# Patient Record
Sex: MALE | Race: BLACK OR AFRICAN AMERICAN | NOT HISPANIC OR LATINO | URBAN - METROPOLITAN AREA
[De-identification: names, ages, dates, MRNs, and addresses within clinical notes are randomized per-mention and may not be internally consistent; named-entity substitution may affect disease eponyms.]

---

## 2018-06-01 ENCOUNTER — OUTPATIENT (OUTPATIENT)
Dept: OUTPATIENT SERVICES | Facility: HOSPITAL | Age: 58
LOS: 1 days | End: 2018-06-01
Payer: MEDICAID

## 2018-06-01 PROCEDURE — G9001: CPT

## 2018-06-12 DIAGNOSIS — R69 ILLNESS, UNSPECIFIED: ICD-10-CM

## 2018-07-01 ENCOUNTER — OUTPATIENT (OUTPATIENT)
Dept: OUTPATIENT SERVICES | Facility: HOSPITAL | Age: 58
LOS: 1 days | End: 2018-07-01

## 2018-07-16 DIAGNOSIS — Z71.89 OTHER SPECIFIED COUNSELING: ICD-10-CM

## 2018-12-18 ENCOUNTER — APPOINTMENT (OUTPATIENT)
Dept: PEDIATRIC ALLERGY IMMUNOLOGY | Facility: CLINIC | Age: 58
End: 2018-12-18

## 2019-02-25 ENCOUNTER — NON-APPOINTMENT (OUTPATIENT)
Age: 59
End: 2019-02-25

## 2019-02-25 ENCOUNTER — LABORATORY RESULT (OUTPATIENT)
Age: 59
End: 2019-02-25

## 2019-02-25 ENCOUNTER — APPOINTMENT (OUTPATIENT)
Dept: PEDIATRIC ALLERGY IMMUNOLOGY | Facility: CLINIC | Age: 59
End: 2019-02-25
Payer: MEDICARE

## 2019-02-25 VITALS
HEART RATE: 106 BPM | DIASTOLIC BLOOD PRESSURE: 93 MMHG | BODY MASS INDEX: 33.27 KG/M2 | SYSTOLIC BLOOD PRESSURE: 146 MMHG | WEIGHT: 212 LBS | HEIGHT: 66.93 IN

## 2019-02-25 DIAGNOSIS — Z83.3 FAMILY HISTORY OF DIABETES MELLITUS: ICD-10-CM

## 2019-02-25 DIAGNOSIS — E11.3552 TYPE 2 DIABETES MELLITUS WITH STABLE PROLIFERATIVE DIABETIC RETINOPATHY, LEFT EYE: ICD-10-CM

## 2019-02-25 DIAGNOSIS — Z83.42 FAMILY HISTORY OF FAMILIAL HYPERCHOLESTEROLEMIA: ICD-10-CM

## 2019-02-25 DIAGNOSIS — I10 ESSENTIAL (PRIMARY) HYPERTENSION: ICD-10-CM

## 2019-02-25 DIAGNOSIS — D86.9 SARCOIDOSIS, UNSPECIFIED: ICD-10-CM

## 2019-02-25 DIAGNOSIS — R79.89 OTHER SPECIFIED ABNORMAL FINDINGS OF BLOOD CHEMISTRY: ICD-10-CM

## 2019-02-25 DIAGNOSIS — R52 PAIN, UNSPECIFIED: ICD-10-CM

## 2019-02-25 DIAGNOSIS — Z13.29 ENCOUNTER FOR SCREENING FOR OTHER SUSPECTED ENDOCRINE DISORDER: ICD-10-CM

## 2019-02-25 DIAGNOSIS — Z13.228 ENCOUNTER FOR SCREENING FOR OTHER SUSPECTED ENDOCRINE DISORDER: ICD-10-CM

## 2019-02-25 DIAGNOSIS — R76.8 OTHER SPECIFIED ABNORMAL IMMUNOLOGICAL FINDINGS IN SERUM: ICD-10-CM

## 2019-02-25 DIAGNOSIS — E78.00 PURE HYPERCHOLESTEROLEMIA, UNSPECIFIED: ICD-10-CM

## 2019-02-25 DIAGNOSIS — Z13.0 ENCOUNTER FOR SCREENING FOR OTHER SUSPECTED ENDOCRINE DISORDER: ICD-10-CM

## 2019-02-25 PROCEDURE — 99205 OFFICE O/P NEW HI 60 MIN: CPT | Mod: 25

## 2019-02-25 PROCEDURE — 94060 EVALUATION OF WHEEZING: CPT

## 2019-02-25 PROCEDURE — 95004 PERQ TESTS W/ALRGNC XTRCS: CPT

## 2019-02-25 PROCEDURE — 94664 DEMO&/EVAL PT USE INHALER: CPT | Mod: 59

## 2019-02-25 RX ORDER — METFORMIN HYDROCHLORIDE 1000 MG/1
1000 TABLET, COATED ORAL
Refills: 0 | Status: ACTIVE | COMMUNITY
Start: 2019-02-25

## 2019-02-25 RX ORDER — CETIRIZINE HYDROCHLORIDE 10 MG/1
10 TABLET, COATED ORAL
Qty: 1 | Refills: 3 | Status: ACTIVE | COMMUNITY
Start: 2019-02-25

## 2019-02-25 RX ORDER — PREDNISONE 20 MG/1
20 TABLET ORAL DAILY
Qty: 8 | Refills: 0 | Status: ACTIVE | COMMUNITY
Start: 2019-02-25

## 2019-02-25 RX ORDER — DULOXETINE HYDROCHLORIDE 30 MG/1
30 CAPSULE, DELAYED RELEASE PELLETS ORAL TWICE DAILY
Refills: 0 | Status: ACTIVE | COMMUNITY
Start: 2019-02-25

## 2019-02-25 RX ORDER — LOSARTAN POTASSIUM 25 MG/1
25 TABLET, FILM COATED ORAL DAILY
Qty: 30 | Refills: 5 | Status: ACTIVE | COMMUNITY
Start: 2019-02-25

## 2019-02-25 RX ORDER — ROSUVASTATIN CALCIUM 40 MG/1
40 TABLET, FILM COATED ORAL DAILY
Refills: 0 | Status: ACTIVE | COMMUNITY
Start: 2019-02-25

## 2019-02-25 RX ORDER — ISOSORBIDE MONONITRATE 30 MG/1
30 TABLET, EXTENDED RELEASE ORAL DAILY
Refills: 0 | Status: ACTIVE | COMMUNITY
Start: 2019-02-25

## 2019-02-25 RX ORDER — MELOXICAM 7.5 MG/1
7.5 TABLET ORAL
Refills: 0 | Status: ACTIVE | COMMUNITY
Start: 2019-02-25

## 2019-02-25 RX ORDER — IBUPROFEN 800 MG/1
800 TABLET, FILM COATED ORAL
Refills: 0 | Status: ACTIVE | COMMUNITY
Start: 2019-02-25

## 2019-02-25 RX ORDER — DILTIAZEM HYDROCHLORIDE 360 MG/1
360 CAPSULE, EXTENDED RELEASE ORAL DAILY
Refills: 0 | Status: ACTIVE | COMMUNITY
Start: 2019-02-25

## 2019-02-25 RX ORDER — ACETAMINOPHEN 500 MG/1
500 TABLET ORAL
Refills: 0 | Status: ACTIVE | COMMUNITY
Start: 2019-02-25

## 2019-02-25 RX ORDER — MONTELUKAST 10 MG/1
10 TABLET, FILM COATED ORAL
Refills: 0 | Status: ACTIVE | COMMUNITY
Start: 2019-02-25

## 2019-02-25 RX ORDER — MULTIVIT-MIN/FOLIC/VIT K/LYCOP 400-300MCG
25 MCG TABLET ORAL DAILY
Refills: 0 | Status: ACTIVE | COMMUNITY
Start: 2019-02-25

## 2019-02-25 RX ORDER — GABAPENTIN 600 MG/1
600 TABLET, COATED ORAL TWICE DAILY
Refills: 0 | Status: ACTIVE | COMMUNITY
Start: 2019-02-25

## 2019-02-25 RX ORDER — HYDROCHLOROTHIAZIDE 12.5 MG/1
12.5 TABLET ORAL DAILY
Refills: 0 | Status: ACTIVE | COMMUNITY
Start: 2019-02-25

## 2019-02-25 RX ORDER — BENZONATATE 200 MG/1
200 CAPSULE ORAL 3 TIMES DAILY
Qty: 42 | Refills: 0 | Status: ACTIVE | COMMUNITY
Start: 2019-02-25

## 2019-02-26 PROBLEM — R76.8 ELEVATED IGE LEVEL: Status: ACTIVE | Noted: 2019-02-26

## 2019-02-26 PROBLEM — Z13.29 SCREENING FOR OTHER AND UNSPECIFIED ENDOCRINE, NUTRITIONAL, METABOLIC AND IMMUNITY DISORDERS: Status: ACTIVE | Noted: 2019-02-26

## 2019-02-26 PROBLEM — E78.00 HYPERCHOLESTEREMIA: Status: ACTIVE | Noted: 2019-02-25

## 2019-02-26 PROBLEM — D86.9 SARCOIDOSIS: Status: ACTIVE | Noted: 2019-02-25

## 2019-02-26 LAB
BASOPHILS # BLD AUTO: 0.04 K/UL
BASOPHILS NFR BLD AUTO: 0.5 %
CD16+CD56+ CELLS # BLD: 177 /UL
CD16+CD56+ CELLS NFR BLD: 13 %
CD19 CELLS NFR BLD: 302 /UL
CD3 CELLS # BLD: 873 /UL
CD3 CELLS NFR BLD: 63 %
CD3+CD4+ CELLS # BLD: 692 /UL
CD3+CD4+ CELLS NFR BLD: 50 %
CD3+CD4+ CELLS/CD3+CD8+ CLL SPEC: 3.67 RATIO
CD3+CD8+ CELLS # SPEC: 188 /UL
CD3+CD8+ CELLS NFR BLD: 14 %
CELLS.CD3-CD19+/CELLS IN BLOOD: 22 %
CH50 SERPL-MCNC: 73 U/ML
DEPRECATED KAPPA LC FREE/LAMBDA SER: 1.11 RATIO
EOSINOPHIL # BLD AUTO: 0.08 K/UL
EOSINOPHIL NFR BLD AUTO: 0.9 %
HCT VFR BLD CALC: 44.2 %
HGB BLD-MCNC: 13.7 G/DL
IGA SER QL IEP: 370 MG/DL
IGG SER QL IEP: 1406 MG/DL
IGM SER QL IEP: 29 MG/DL
IMM GRANULOCYTES NFR BLD AUTO: 0.2 %
KAPPA LC CSF-MCNC: 2.21 MG/DL
KAPPA LC SERPL-MCNC: 2.46 MG/DL
LYMPHOCYTES # BLD AUTO: 1.25 K/UL
LYMPHOCYTES NFR BLD AUTO: 14.4 %
MAN DIFF?: NORMAL
MCHC RBC-ENTMCNC: 24.8 PG
MCHC RBC-ENTMCNC: 31 GM/DL
MCV RBC AUTO: 79.9 FL
MONOCYTES # BLD AUTO: 0.63 K/UL
MONOCYTES NFR BLD AUTO: 7.3 %
NEUTROPHILS # BLD AUTO: 6.64 K/UL
NEUTROPHILS NFR BLD AUTO: 76.7 %
PLATELET # BLD AUTO: 310 K/UL
RBC # BLD: 5.53 M/UL
RBC # FLD: 14.6 %
WBC # FLD AUTO: 8.66 K/UL

## 2019-02-26 NOTE — REASON FOR VISIT
[Initial Consultation] : an initial consultation for [Formal Caregiver] : formal caregiver [Cough] : cough [Abnormal Labwork] : abnormal immunology labwork

## 2019-02-27 LAB
G6PD SER-CCNC: 15.4 U/G HGB
MEV IGG FLD QL IA: >300 AU/ML
MEV IGG+IGM SER-IMP: POSITIVE
MYELOPEROXIDASE SPEC: POSITIVE
VZV AB TITR SER: POSITIVE
VZV IGG SER IF-ACNC: 2304 INDEX

## 2019-02-28 LAB — IGE SER-MCNC: 1897 IU/ML

## 2019-03-01 LAB
A FLAVUS AB FLD QL: NEGATIVE
A FUMIGATUS AB FLD QL: NEGATIVE
A NIGER AB FLD QL: NEGATIVE

## 2019-03-04 LAB — LPT PW BLD-NRATE: NORMAL

## 2019-03-04 NOTE — DATA REVIEWED
[FreeTextEntry1] : outside medical recordL\par 7/2018\par Immunocap to common aeroallergens: \par positive to dust mite, cockroach, molds (inlcuding A. fumigatus)\par negative to weeds, cat dander, dog dander, \par \par ImmunoCAP to foods:\par Positive to cow's milk (0.72), wheat (0.36) - he consumes milk and wheat regularly w/o complaints\par negative to cod, peanut, shrimp, walnut, egg white, corn, soy, clam, sesame, scallop\par \par labs from 9/2018\par Negative Aspergillus galactomannan\par negative filaria IgG4\par negative Fungitell\par unremarkable ACE \par unremarkable procalcitonin\par negative strongyloides Ab\par \par labs from 9/2017\par negative HIV CMIA\par \par spirometry from 1/2018\par normal flows with no improvement after bronchodilator therapy\par essentially normal lung volumes\par normal DLCO, SpO2\par \par 9/6/18 CT sinus w/o contrast\par no air fluid levels or mucoperiosteal thickening to suggest either acute or chronic sinusitis. Ostiomeatal complexes are patent B/L\par \par 9/5/18 CTA chest w/ contrast\par no evidence of PE. nonspecific pulmonary opacities \par \par 1/19/18 CT chest PE w/ contrast\par no intraluminal filling defects w/in main pulmonary arteries however the segmental branch arteries cannot be assessed adequately. no evidence of acute pulmonary process.

## 2019-03-04 NOTE — CONSULT LETTER
[Dear  ___] : Dear  [unfilled], [Please see my note below.] : Please see my note below. [Consult Closing:] : Thank you very much for allowing me to participate in the care of this patient.  If you have any questions, please do not hesitate to contact me. [Sincerely,] : Sincerely, [DrLeif  ___] : Dr. ISAACS [FreeTextEntry3] : Jack Yates III  MPH, MD, PhD, FACP, FACAAI, FAAAAI \par , Departments of Medicine and Pediatrics \par [employer] [facility] [employer] [facility] \par , [facility] [employer] [facility] \par Attending Physician, Division of Allergy & Immunology [facility]\par \par \par

## 2019-03-04 NOTE — HISTORY OF PRESENT ILLNESS
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 He is referred by [facility] pulmonary for chronic cough.\par \par Chronic steroid use & cough: He has been on chronic steroids for at least 2 years (first for sarcoidosis, then for cough/asthma). Per outside medical records, he has been on Prednisone 40mg daily at least since 9/2018. (Prior to that he was on an unspecified dose oF prednisone for sarcoidosis from [id number]). During this time, his elevated serum IgE was noted to decreased (9/2018 = 4247, [id number]). Despite taking his medications, he continues to have chronic cough that is not improved. The cough started 2-3 yrs ago, and occurs all year round. In the past, cough was dry but currently sometimes productive for phlegm.  The cough persists despite benzonatate 3x a day and Prednisone 40mg daily. Of note, he is on losartan, and patient does not think there is an association with the cough and this medication. The cough is not associated with fevers, chills, weight loss. He does admit to getting antibiotics several times  a year - he cannot estimate how many times but thinks <10times a year. He says he also gets "colds" frequently.\par \par asthma:  ACT today is 12 suggesting suboptimal control. He takes singulair 10mg daily and albuterol MDI as rescue medication when needed. When shown a chart of currently approved asthma inhaler medications, he pointed out that he uses albuterol MDI at least once daily. He did not recognize/point out any controller inhalers on the chart. Per outside medical record, he is supposed to be on Symbicort 160/4.5 BID. He did not bring any inhalers with him to the visit, but alireza all his other medication bottles. He is referred to [facility] for evaluation and consideration for possible biologic therapy. His ACT today is 12 suggesting suboptimal control.\par \par GERD: per outside medical record, patient has evidence of moderate GERD on esophogram and should be on Nexium and Ranitidine. He states his reflux symptoms are well controlled. He did not bring pill bottles for Nexium and Ranitidine and was not sure if he was taking these.\par \par Sarcoidosis: per outside medical record, he has biopsy proven sarcoidosis. He was previously on prednisone from 1/2016-2/2017 for this. HIs PFTs done in 1/2018 were reportedly normal and CT in 1/2018 and 9/2018 did not show any nodules or lymphadenopathy, so he is currently not on any treatment for this.\par \par rhinitis: he states he gets runny, stuffy nose throughout the year. There is no seasonality to his symptoms. He has been on cetirizine "for a while" which he thinks helps his nasal complaints. he denies using nose sprays in the past. No obvious triggers have been identified. He does not think he has used antihistamines in a "few days"\par \par Positive ImmunoCAP to foods: Prior testing was positive to cow's milk and wheat. he consumes milk and wheat regularly w/o complaints. His diet is otherwise unrestricted. [FreeTextEntry7] : 12

## 2019-03-04 NOTE — REVIEW OF SYSTEMS
[Immunizations are up to date] : Immunizations are up to date [Received Influenza Vaccine this Past Year] : patient has received the Influenza vaccine this past year [Rhinorrhea] : rhinorrhea [Nasal Congestion] : nasal congestion [Cough] : cough [Nl] : Genitourinary [SOB with Exertion] : no dyspnea on exertion [Nocturnal Awakening] : no nocturnal awakening with shortness of breath [Wheezing] : no wheezing

## 2019-03-04 NOTE — SOCIAL HISTORY
[Spouse/Partner] : spouse/partner [Grade:  _____] : Grade: [unfilled] [Apartment] : [unfilled] lives in an apartment  [Length of Occupancy (yrs)___] : the length of occupancy is [unfilled] years [Radiator/Baseboard] : heating provided by radiator(s)/baseboard(s) [Window Units] : air conditioning provided by window units [None] : none [] :  [de-identified] : child at home [FreeTextEntry2] : works as  [Humidifier] : does not use a humidifier [Dehumidifier] : does not use a dehumidifier [Cockroaches] : Patient states that there are no cockroaches in the home [Dust Mite Covers] : does not have dust mite covers [Feather Pillows] : does not have feather pillows [Feather Comforter] : does not have a feather comforter [Bedroom] : not in the bedroom [Living Area] : not in the living area [Smokers in Household] : there are no smokers in the home

## 2019-03-04 NOTE — PHYSICAL EXAM
[Alert] : alert [Well Nourished] : well nourished [Healthy Appearance] : healthy appearance [No Acute Distress] : no acute distress [Well Developed] : well developed [Normal Pupil & Iris Size/Symmetry] : normal pupil and iris size and symmetry [No Discharge] : no discharge [No Photophobia] : no photophobia [Sclera Not Icteric] : sclera not icteric [Normal TMs] : both tympanic membranes were normal [Normal Nasal Mucosa] : the nasal mucosa was normal [Normal Lips/Tongue] : the lips and tongue were normal [Normal Outer Ear/Nose] : the ears and nose were normal in appearance [Normal Tonsils] : normal tonsils [No Thrush] : no thrush [Normal Dentition] : normal dentition [No Oral Lesions or Ulcers] : no oral lesions or ulcers [Supple] : the neck was supple [Normal Rate and Effort] : normal respiratory rhythm and effort [Normal Palpation] : palpation of the chest revealed no abnormalities [No Crackles] : no crackles [No Retractions] : no retractions [Bilateral Audible Breath Sounds] : bilateral audible breath sounds [Normal Rate] : heart rate was normal  [Normal S1, S2] : normal S1 and S2 [No murmur] : no murmur [Regular Rhythm] : with a regular rhythm [Soft] : abdomen soft [Not Tender] : non-tender [Not Distended] : not distended [No HSM] : no hepato-splenomegaly [Normal Cervical Lymph Nodes] : cervical [Normal Axillary Lumph Nodes] : axillary [Skin Intact] : skin intact  [No Rash] : no rash [No Skin Lesions] : no skin lesions [No Joint Swelling or Erythema] : no joint swelling or erythema [No clubbing] : no clubbing [No Edema] : no edema [No Cyanosis] : no cyanosis [Normal Mood] : mood was normal [Normal Affect] : affect was normal [Alert, Awake, Oriented as Age-Appropriate] : alert, awake, oriented as age appropriate [No Motor Deficits] : the motor exam was normal [Conjunctival Erythema] : no conjunctival erythema [Suborbital Bogginess] : no suborbital bogginess (allergic shiners) [Boggy Nasal Turbinates] : no boggy and/or pale nasal turbinates [Pharyngeal erythema] : no pharyngeal erythema [Exudate] : no exudate [Posterior Pharyngeal Cobblestoning] : no posterior pharyngeal cobblestoning [Clear Rhinorrhea] : no clear rhinorrhea was seen [Wheezing] : no wheezing was heard [Eczematous Patches] : no eczematous patches [Xerosis] : no xerosis [de-identified] : not dermatographic

## 2019-03-06 LAB
ASPERGILLUS FLAVUS PRECIPITINS: NEGATIVE
ASPERGILLUS FUMIGATES PRECIPTINS: NEGATIVE
ASPERGILLUS NIGER PRECIPITINS: NEGATIVE

## 2019-03-08 LAB
A FUMIGATUS IGE QN: 1.81 KUA/L
A NIGER IGE QN: 0.37 KUA/L
DEPRECATED A FUMIGATUS IGE RAST QL: 2
DEPRECATED A NIGER IGE RAST QL: 1
HAEM INFLU B AB SER-MCNC: NORMAL

## 2019-03-13 LAB
DEPRECATED S PNEUM 1 IGG SER-MCNC: 13.9
DEPRECATED S PNEUM12 AB SER-ACNC: 3.9
DEPRECATED S PNEUM14 AB SER-ACNC: 26.3
DEPRECATED S PNEUM17 IGG SER IA-MCNC: 39.1
DEPRECATED S PNEUM18 IGG SER IA-MCNC: 24
DEPRECATED S PNEUM19 IGG SER-MCNC: 29
DEPRECATED S PNEUM19 IGG SER-MCNC: >150
DEPRECATED S PNEUM2 IGG SER-MCNC: 4.8
DEPRECATED S PNEUM20 IGG SER-MCNC: 25.3
DEPRECATED S PNEUM22 IGG SER-MCNC: 46.5
DEPRECATED S PNEUM23 AB SER-ACNC: 67.3
DEPRECATED S PNEUM3 AB SER-ACNC: 12.4
DEPRECATED S PNEUM34 IGG SER-MCNC: 30.6
DEPRECATED S PNEUM4 AB SER-ACNC: 3.1
DEPRECATED S PNEUM5 IGG SER-MCNC: 11.9
DEPRECATED S PNEUM6 IGG SER-MCNC: 13.7
DEPRECATED S PNEUM7 IGG SER-ACNC: 21.4
DEPRECATED S PNEUM8 AB SER-ACNC: 12
DEPRECATED S PNEUM9 AB SER-ACNC: 28.2
DEPRECATED S PNEUM9 IGG SER-MCNC: 17.6
STREPTOCOCCUS PNEUMONIAE SEROTYPE 11A: 6.7
STREPTOCOCCUS PNEUMONIAE SEROTYPE 15B: 10.2
STREPTOCOCCUS PNEUMONIAE SEROTYPE 33F: 8.3

## 2019-05-14 ENCOUNTER — APPOINTMENT (OUTPATIENT)
Dept: PEDIATRIC ALLERGY IMMUNOLOGY | Facility: CLINIC | Age: 59
End: 2019-05-14
Payer: MEDICARE

## 2019-05-14 VITALS
OXYGEN SATURATION: 95 % | WEIGHT: 250 LBS | HEIGHT: 66 IN | BODY MASS INDEX: 40.18 KG/M2 | SYSTOLIC BLOOD PRESSURE: 121 MMHG | DIASTOLIC BLOOD PRESSURE: 74 MMHG | HEART RATE: 98 BPM

## 2019-05-14 PROCEDURE — 96372 THER/PROPH/DIAG INJ SC/IM: CPT

## 2019-05-14 RX ORDER — DUPILUMAB 300 MG/2ML
300 INJECTION, SOLUTION SUBCUTANEOUS
Refills: 0 | Status: COMPLETED | OUTPATIENT
Start: 2019-05-14 | End: 1900-01-01

## 2019-05-14 RX ADMIN — DUPILUMAB 0 MG/2ML: 300 INJECTION, SOLUTION SUBCUTANEOUS at 00:00

## 2019-05-22 RX ADMIN — DUPILUMAB 0 MG/2ML: 300 INJECTION, SOLUTION SUBCUTANEOUS at 00:00

## 2019-05-29 ENCOUNTER — APPOINTMENT (OUTPATIENT)
Dept: PEDIATRIC ALLERGY IMMUNOLOGY | Facility: CLINIC | Age: 59
End: 2019-05-29
Payer: MEDICARE

## 2019-05-29 VITALS — SYSTOLIC BLOOD PRESSURE: 123 MMHG | DIASTOLIC BLOOD PRESSURE: 79 MMHG | OXYGEN SATURATION: 95 % | HEART RATE: 98 BPM

## 2019-05-29 PROCEDURE — 96372 THER/PROPH/DIAG INJ SC/IM: CPT

## 2019-05-29 RX ORDER — DUPILUMAB 300 MG/2ML
300 INJECTION, SOLUTION SUBCUTANEOUS
Qty: 0 | Refills: 0 | Status: COMPLETED | OUTPATIENT
Start: 2019-05-22

## 2019-06-05 RX ADMIN — DUPILUMAB 0 MG/2ML: 300 INJECTION, SOLUTION SUBCUTANEOUS at 00:00

## 2019-06-11 ENCOUNTER — APPOINTMENT (OUTPATIENT)
Dept: PEDIATRIC ALLERGY IMMUNOLOGY | Facility: CLINIC | Age: 59
End: 2019-06-11

## 2019-06-12 ENCOUNTER — APPOINTMENT (OUTPATIENT)
Dept: PEDIATRIC ALLERGY IMMUNOLOGY | Facility: CLINIC | Age: 59
End: 2019-06-12
Payer: MEDICARE

## 2019-06-12 VITALS — OXYGEN SATURATION: 94 % | DIASTOLIC BLOOD PRESSURE: 85 MMHG | HEART RATE: 86 BPM | SYSTOLIC BLOOD PRESSURE: 127 MMHG

## 2019-06-12 PROCEDURE — 96372 THER/PROPH/DIAG INJ SC/IM: CPT

## 2019-06-12 RX ORDER — DUPILUMAB 300 MG/2ML
300 INJECTION, SOLUTION SUBCUTANEOUS
Qty: 0 | Refills: 0 | Status: COMPLETED | OUTPATIENT
Start: 2019-06-05

## 2019-06-19 RX ADMIN — DUPILUMAB 0 MG/2ML: 300 INJECTION, SOLUTION SUBCUTANEOUS at 00:00

## 2019-06-24 ENCOUNTER — APPOINTMENT (OUTPATIENT)
Dept: PEDIATRIC ALLERGY IMMUNOLOGY | Facility: CLINIC | Age: 59
End: 2019-06-24
Payer: MEDICARE

## 2019-06-24 VITALS
HEART RATE: 99 BPM | SYSTOLIC BLOOD PRESSURE: 120 MMHG | HEIGHT: 66 IN | WEIGHT: 216 LBS | OXYGEN SATURATION: 97 % | DIASTOLIC BLOOD PRESSURE: 75 MMHG | BODY MASS INDEX: 34.72 KG/M2

## 2019-06-24 DIAGNOSIS — J31.0 CHRONIC RHINITIS: ICD-10-CM

## 2019-06-24 DIAGNOSIS — Z91.038 OTHER INSECT ALLERGY STATUS: ICD-10-CM

## 2019-06-24 DIAGNOSIS — R05 COUGH: ICD-10-CM

## 2019-06-24 DIAGNOSIS — J30.89 OTHER ALLERGIC RHINITIS: ICD-10-CM

## 2019-06-24 DIAGNOSIS — Z91.09 OTHER ALLERGY STATUS, OTHER THAN TO DRUGS AND BIOLOGICAL SUBSTANCES: ICD-10-CM

## 2019-06-24 DIAGNOSIS — Z91.048 OTHER NONMEDICINAL SUBSTANCE ALLERGY STATUS: ICD-10-CM

## 2019-06-24 DIAGNOSIS — H10.13 ACUTE ATOPIC CONJUNCTIVITIS, BILATERAL: ICD-10-CM

## 2019-06-24 PROCEDURE — 96372 THER/PROPH/DIAG INJ SC/IM: CPT

## 2019-06-24 PROCEDURE — 99214 OFFICE O/P EST MOD 30 MIN: CPT | Mod: 25

## 2019-06-24 RX ORDER — AZELASTINE HYDROCHLORIDE 137 UG/1
0.1 SPRAY, METERED NASAL
Qty: 1 | Refills: 3 | Status: ACTIVE | COMMUNITY
Start: 2019-06-24 | End: 1900-01-01

## 2019-06-24 RX ORDER — KETOTIFEN FUMARATE 0.25 MG/ML
0.03 SOLUTION OPHTHALMIC
Qty: 1 | Refills: 3 | Status: ACTIVE | COMMUNITY
Start: 2019-06-24 | End: 1900-01-01

## 2019-06-24 NOTE — CONSULT LETTER
[Dear  ___] : Dear  [unfilled], [Courtesy Letter:] : I had the pleasure of seeing your patient, [unfilled], in my office today. [DrLeif  ___] : Dr. ISAACS [Please see my note below.] : Please see my note below. [Sincerely,] : Sincerely, [FreeTextEntry3] : Jack Yates III  MPH, MD, PhD, FACP, FACAAI, FAAAAI \par , Departments of Medicine and Pediatrics \par [employer] [facility] [employer] at [facility] \par , [facility] [employer] [facility] [employer] [facility] \par Attending Physician, Division of Allergy & Immunology [facility]\par \par \par

## 2019-06-24 NOTE — PHYSICAL EXAM
[Alert] : alert [Well Nourished] : well nourished [Healthy Appearance] : healthy appearance [Well Developed] : well developed [No Acute Distress] : no acute distress [Normal Pupil & Iris Size/Symmetry] : normal pupil and iris size and symmetry [No Discharge] : no discharge [Sclera Not Icteric] : sclera not icteric [No Photophobia] : no photophobia [Conjunctival Erythema] : no conjunctival erythema [Suborbital Bogginess] : no suborbital bogginess (allergic shiners) [Normal TMs] : both tympanic membranes were normal [Normal Nasal Mucosa] : the nasal mucosa was normal [Normal Lips/Tongue] : the lips and tongue were normal [Normal Outer Ear/Nose] : the ears and nose were normal in appearance [Normal Tonsils] : normal tonsils [No Thrush] : no thrush [Normal Dentition] : normal dentition [No Oral Lesions or Ulcers] : no oral lesions or ulcers [Boggy Nasal Turbinates] : no boggy and/or pale nasal turbinates [Pharyngeal erythema] : no pharyngeal erythema [Posterior Pharyngeal Cobblestoning] : no posterior pharyngeal cobblestoning [Exudate] : no exudate [Clear Rhinorrhea] : no clear rhinorrhea was seen [Supple] : the neck was supple [Normal Rate and Effort] : normal respiratory rhythm and effort [Normal Palpation] : palpation of the chest revealed no abnormalities [No Crackles] : no crackles [No Retractions] : no retractions [Bilateral Audible Breath Sounds] : bilateral audible breath sounds [Wheezing] : no wheezing was heard [Normal Rate] : heart rate was normal  [Normal S1, S2] : normal S1 and S2 [No murmur] : no murmur [Regular Rhythm] : with a regular rhythm [Soft] : abdomen soft [Not Tender] : non-tender [Not Distended] : not distended [No HSM] : no hepato-splenomegaly [Normal Cervical Lymph Nodes] : cervical [Normal Axillary Lumph Nodes] : axillary [Skin Intact] : skin intact  [No Rash] : no rash [No Skin Lesions] : no skin lesions [Eczematous Patches] : no eczematous patches [Xerosis] : no xerosis [No Joint Swelling or Erythema] : no joint swelling or erythema [No clubbing] : no clubbing [No Edema] : no edema [No Cyanosis] : no cyanosis [No Motor Deficits] : the motor exam was normal [Normal Mood] : mood was normal [Normal Affect] : affect was normal [Alert, Awake, Oriented as Age-Appropriate] : alert, awake, oriented as age appropriate [de-identified] : not dermatographic

## 2019-06-24 NOTE — DATA REVIEWED
[FreeTextEntry1] : labs from 2/2019\par cellular immune eval:\par unremarkable CBC\par unremarkable T, B, NK cell counts\par unremarkable lymphocyte proliferation studies\par \par humoral immune eval:\par elevated IgE, low IgM, elevated kappa light chain with otherwise unremarkable IgG, IgA, Lambda light chain\par protective titers to measles, varicella, Hib, pneumococcus\par QNS: titers to tetanus\par \par phagocyte eval:\par unremarkable myeloperoxidase stain, G6PD, CGD assay\par \par complement eval:\par unremarkable CH50\par \par ABPA eval:\par positive IgE to A. fumigatus, A. niger\par negative precipitins to A. niger, A. flavus, A. fumigatus\par \par sarcoid eval:\par QNS: ACE level \par

## 2019-06-24 NOTE — REVIEW OF SYSTEMS
[Eye Itching] : itchy eyes [Rhinorrhea] : no rhinorrhea [Nasal Congestion] : no nasal congestion [Sneezing] : no sneezing [SOB with Exertion] : no dyspnea on exertion [Nocturnal Awakening] : no nocturnal awakening with shortness of breath [Cough] : cough [Wheezing] : no wheezing [Nl] : Genitourinary

## 2019-06-24 NOTE — HISTORY OF PRESENT ILLNESS
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 \par \par he started Dupixent in May 2019, and is now on Q 2 week dosing per protocol. Patient states the his cough is not significantly improved on Dupixent.\par he continues to be on losartan (ARB) which can cause cough in some patients. He has not spoken to his primary care doctor or other specialists about switching of ARB if possible.\par he is also currently taking Singulair and cetirizine regularly, and he doesn't currently complain of any nasal complaints any denies any post nasal . He gets occasionally itchy watery eyes but is not currently using any eye drops. Prior ImmunoCAP testing was positive to cockroach, dust mite, and molds.\par ACT today is 5 suggesting suboptimal asthma control. He has follow up with pulmonary in July. He currently denies exercise intolerance but admits to not being a very active person to begin with. He denies wheezing but states that he uses his albuterol at least 1-2x  a day. he denies nocturnal complaints. [< or = 15] : < than or = 15  [FreeTextEntry7] : 5

## 2019-06-24 NOTE — REASON FOR VISIT
[Routine Follow-Up] : a routine follow-up visit for [Source: ______] : History obtained from [unfilled] [[employer] ] : provided by [employer]   [Asthma] : asthma [Cough] : cough [FreeTextEntry1] : [id number] [FreeTextEntry2] : Elijah

## 2019-07-03 RX ADMIN — DUPILUMAB 0 MG/2ML: 300 INJECTION, SOLUTION SUBCUTANEOUS at 00:00

## 2019-07-08 ENCOUNTER — APPOINTMENT (OUTPATIENT)
Dept: PEDIATRIC ALLERGY IMMUNOLOGY | Facility: CLINIC | Age: 59
End: 2019-07-08
Payer: MEDICARE

## 2019-07-08 VITALS — SYSTOLIC BLOOD PRESSURE: 118 MMHG | HEART RATE: 97 BPM | DIASTOLIC BLOOD PRESSURE: 79 MMHG

## 2019-07-08 PROCEDURE — 96372 THER/PROPH/DIAG INJ SC/IM: CPT

## 2019-07-08 RX ORDER — DUPILUMAB 300 MG/2ML
300 INJECTION, SOLUTION SUBCUTANEOUS
Qty: 0 | Refills: 0 | Status: COMPLETED | OUTPATIENT
Start: 2019-07-03

## 2019-07-11 RX ORDER — DUPILUMAB 300 MG/2ML
300 INJECTION, SOLUTION SUBCUTANEOUS
Qty: 0 | Refills: 0 | Status: COMPLETED | OUTPATIENT
Start: 2019-06-19

## 2019-07-17 RX ADMIN — DUPILUMAB 0 MG/2ML: 300 INJECTION, SOLUTION SUBCUTANEOUS at 00:00

## 2019-07-22 ENCOUNTER — APPOINTMENT (OUTPATIENT)
Dept: PEDIATRIC ALLERGY IMMUNOLOGY | Facility: CLINIC | Age: 59
End: 2019-07-22
Payer: MEDICARE

## 2019-07-22 VITALS — OXYGEN SATURATION: 94 % | HEART RATE: 102 BPM | SYSTOLIC BLOOD PRESSURE: 138 MMHG | DIASTOLIC BLOOD PRESSURE: 78 MMHG

## 2019-07-22 PROCEDURE — 96372 THER/PROPH/DIAG INJ SC/IM: CPT

## 2019-07-22 RX ORDER — DUPILUMAB 300 MG/2ML
300 INJECTION, SOLUTION SUBCUTANEOUS
Qty: 0 | Refills: 0 | Status: COMPLETED | OUTPATIENT
Start: 2019-05-14

## 2019-07-31 RX ADMIN — DUPILUMAB 0 MG/2ML: 300 INJECTION, SOLUTION SUBCUTANEOUS at 00:00

## 2019-08-06 ENCOUNTER — APPOINTMENT (OUTPATIENT)
Dept: PEDIATRIC ALLERGY IMMUNOLOGY | Facility: CLINIC | Age: 59
End: 2019-08-06
Payer: MEDICARE

## 2019-08-06 VITALS — SYSTOLIC BLOOD PRESSURE: 124 MMHG | HEART RATE: 106 BPM | OXYGEN SATURATION: 96 % | DIASTOLIC BLOOD PRESSURE: 84 MMHG

## 2019-08-06 PROCEDURE — 96372 THER/PROPH/DIAG INJ SC/IM: CPT

## 2019-08-06 RX ORDER — DUPILUMAB 300 MG/2ML
300 INJECTION, SOLUTION SUBCUTANEOUS
Qty: 0 | Refills: 0 | Status: COMPLETED | OUTPATIENT
Start: 2019-07-31

## 2019-08-14 RX ADMIN — DUPILUMAB 0 MG/2ML: 300 INJECTION, SOLUTION SUBCUTANEOUS at 00:00

## 2019-08-20 ENCOUNTER — APPOINTMENT (OUTPATIENT)
Dept: PEDIATRIC ALLERGY IMMUNOLOGY | Facility: CLINIC | Age: 59
End: 2019-08-20
Payer: MEDICARE

## 2019-08-20 VITALS
SYSTOLIC BLOOD PRESSURE: 123 MMHG | DIASTOLIC BLOOD PRESSURE: 84 MMHG | HEART RATE: 84 BPM | OXYGEN SATURATION: 95 % | HEIGHT: 66 IN

## 2019-08-20 PROCEDURE — 96372 THER/PROPH/DIAG INJ SC/IM: CPT

## 2019-08-20 RX ORDER — DUPILUMAB 300 MG/2ML
300 INJECTION, SOLUTION SUBCUTANEOUS
Qty: 0 | Refills: 0 | Status: COMPLETED | OUTPATIENT
Start: 2019-08-14

## 2019-08-28 RX ADMIN — DUPILUMAB 0 MG/2ML: 300 INJECTION, SOLUTION SUBCUTANEOUS at 00:00

## 2019-09-03 ENCOUNTER — APPOINTMENT (OUTPATIENT)
Dept: PEDIATRIC ALLERGY IMMUNOLOGY | Facility: CLINIC | Age: 59
End: 2019-09-03

## 2019-09-03 VITALS — HEART RATE: 108 BPM | SYSTOLIC BLOOD PRESSURE: 133 MMHG | DIASTOLIC BLOOD PRESSURE: 88 MMHG

## 2019-09-06 ENCOUNTER — APPOINTMENT (OUTPATIENT)
Dept: PEDIATRIC ALLERGY IMMUNOLOGY | Facility: CLINIC | Age: 59
End: 2019-09-06
Payer: MEDICARE

## 2019-09-06 VITALS — HEART RATE: 99 BPM | SYSTOLIC BLOOD PRESSURE: 124 MMHG | DIASTOLIC BLOOD PRESSURE: 79 MMHG | OXYGEN SATURATION: 99 %

## 2019-09-06 PROCEDURE — 96372 THER/PROPH/DIAG INJ SC/IM: CPT

## 2019-09-06 RX ORDER — DUPILUMAB 300 MG/2ML
300 INJECTION, SOLUTION SUBCUTANEOUS
Qty: 0 | Refills: 0 | Status: COMPLETED | OUTPATIENT
Start: 2019-08-28

## 2019-09-11 RX ADMIN — DUPILUMAB 0 MG/2ML: 300 INJECTION, SOLUTION SUBCUTANEOUS at 00:00

## 2019-09-20 ENCOUNTER — APPOINTMENT (OUTPATIENT)
Dept: PEDIATRIC ALLERGY IMMUNOLOGY | Facility: CLINIC | Age: 59
End: 2019-09-20
Payer: MEDICARE

## 2019-09-20 VITALS — SYSTOLIC BLOOD PRESSURE: 131 MMHG | DIASTOLIC BLOOD PRESSURE: 88 MMHG | HEART RATE: 100 BPM | OXYGEN SATURATION: 95 %

## 2019-09-20 PROCEDURE — 96372 THER/PROPH/DIAG INJ SC/IM: CPT

## 2019-09-20 RX ORDER — DUPILUMAB 300 MG/2ML
300 INJECTION, SOLUTION SUBCUTANEOUS
Qty: 0 | Refills: 0 | Status: COMPLETED | OUTPATIENT
Start: 2019-09-11

## 2019-09-25 RX ADMIN — DUPILUMAB 0 MG/2ML: 300 INJECTION, SOLUTION SUBCUTANEOUS at 00:00

## 2019-10-04 ENCOUNTER — APPOINTMENT (OUTPATIENT)
Dept: PEDIATRIC ALLERGY IMMUNOLOGY | Facility: CLINIC | Age: 59
End: 2019-10-04

## 2019-10-04 VITALS — OXYGEN SATURATION: 96 % | DIASTOLIC BLOOD PRESSURE: 84 MMHG | HEART RATE: 106 BPM | SYSTOLIC BLOOD PRESSURE: 124 MMHG

## 2019-10-09 RX ADMIN — DUPILUMAB 0 MG/2ML: 300 INJECTION, SOLUTION SUBCUTANEOUS at 00:00

## 2019-10-23 RX ADMIN — DUPILUMAB 0 MG/2ML: 300 INJECTION, SOLUTION SUBCUTANEOUS at 00:00

## 2019-11-06 RX ADMIN — DUPILUMAB 0 MG/2ML: 300 INJECTION, SOLUTION SUBCUTANEOUS at 00:00

## 2019-11-20 RX ADMIN — DUPILUMAB 0 MG/2ML: 300 INJECTION, SOLUTION SUBCUTANEOUS at 00:00

## 2019-11-22 ENCOUNTER — APPOINTMENT (OUTPATIENT)
Dept: PEDIATRIC ALLERGY IMMUNOLOGY | Facility: CLINIC | Age: 59
End: 2019-11-22
Payer: MEDICARE

## 2019-11-22 VITALS
SYSTOLIC BLOOD PRESSURE: 132 MMHG | BODY MASS INDEX: 35.57 KG/M2 | OXYGEN SATURATION: 95 % | WEIGHT: 220.4 LBS | HEART RATE: 95 BPM | DIASTOLIC BLOOD PRESSURE: 88 MMHG

## 2019-11-22 PROCEDURE — 96372 THER/PROPH/DIAG INJ SC/IM: CPT

## 2019-11-22 RX ORDER — DUPILUMAB 300 MG/2ML
300 INJECTION, SOLUTION SUBCUTANEOUS
Qty: 0 | Refills: 0 | Status: COMPLETED | OUTPATIENT
Start: 2019-11-20

## 2019-12-04 RX ADMIN — DUPILUMAB 0 MG/2ML: 300 INJECTION, SOLUTION SUBCUTANEOUS at 00:00

## 2019-12-18 RX ADMIN — DUPILUMAB 0 MG/2ML: 300 INJECTION, SOLUTION SUBCUTANEOUS at 00:00

## 2019-12-20 ENCOUNTER — APPOINTMENT (OUTPATIENT)
Dept: PEDIATRIC ALLERGY IMMUNOLOGY | Facility: CLINIC | Age: 59
End: 2019-12-20
Payer: MEDICARE

## 2019-12-20 VITALS — DIASTOLIC BLOOD PRESSURE: 82 MMHG | HEART RATE: 98 BPM | SYSTOLIC BLOOD PRESSURE: 124 MMHG | OXYGEN SATURATION: 98 %

## 2019-12-20 PROCEDURE — 96372 THER/PROPH/DIAG INJ SC/IM: CPT

## 2019-12-20 RX ORDER — DUPILUMAB 300 MG/2ML
300 INJECTION, SOLUTION SUBCUTANEOUS
Qty: 0 | Refills: 0 | Status: COMPLETED | OUTPATIENT
Start: 2019-12-18

## 2020-01-01 RX ADMIN — DUPILUMAB 0 MG/2ML: 300 INJECTION, SOLUTION SUBCUTANEOUS at 00:00

## 2020-01-03 ENCOUNTER — APPOINTMENT (OUTPATIENT)
Dept: PEDIATRIC ALLERGY IMMUNOLOGY | Facility: CLINIC | Age: 60
End: 2020-01-03
Payer: MEDICARE

## 2020-01-03 VITALS — DIASTOLIC BLOOD PRESSURE: 85 MMHG | OXYGEN SATURATION: 99 % | HEART RATE: 102 BPM | SYSTOLIC BLOOD PRESSURE: 130 MMHG

## 2020-01-03 PROCEDURE — 96372 THER/PROPH/DIAG INJ SC/IM: CPT

## 2020-01-03 RX ORDER — DUPILUMAB 300 MG/2ML
300 INJECTION, SOLUTION SUBCUTANEOUS
Qty: 0 | Refills: 0 | Status: COMPLETED | OUTPATIENT
Start: 2020-01-01

## 2020-01-15 RX ADMIN — DUPILUMAB 0 MG/2ML: 300 INJECTION, SOLUTION SUBCUTANEOUS at 00:00

## 2020-01-17 ENCOUNTER — APPOINTMENT (OUTPATIENT)
Dept: PEDIATRIC ALLERGY IMMUNOLOGY | Facility: CLINIC | Age: 60
End: 2020-01-17
Payer: MEDICARE

## 2020-01-17 ENCOUNTER — APPOINTMENT (OUTPATIENT)
Dept: PEDIATRIC ALLERGY IMMUNOLOGY | Facility: CLINIC | Age: 60
End: 2020-01-17

## 2020-01-17 VITALS — OXYGEN SATURATION: 96 % | SYSTOLIC BLOOD PRESSURE: 139 MMHG | HEART RATE: 93 BPM | DIASTOLIC BLOOD PRESSURE: 80 MMHG

## 2020-01-17 PROCEDURE — 96372 THER/PROPH/DIAG INJ SC/IM: CPT

## 2020-01-17 RX ORDER — DUPILUMAB 300 MG/2ML
300 INJECTION, SOLUTION SUBCUTANEOUS
Qty: 0 | Refills: 0 | Status: COMPLETED | OUTPATIENT
Start: 2020-01-15

## 2020-01-29 RX ADMIN — DUPILUMAB 0 MG/2ML: 300 INJECTION, SOLUTION SUBCUTANEOUS at 00:00

## 2020-01-31 ENCOUNTER — APPOINTMENT (OUTPATIENT)
Dept: PEDIATRIC ALLERGY IMMUNOLOGY | Facility: CLINIC | Age: 60
End: 2020-01-31
Payer: MEDICARE

## 2020-01-31 VITALS — DIASTOLIC BLOOD PRESSURE: 90 MMHG | OXYGEN SATURATION: 96 % | HEART RATE: 110 BPM | SYSTOLIC BLOOD PRESSURE: 139 MMHG

## 2020-01-31 PROCEDURE — 96372 THER/PROPH/DIAG INJ SC/IM: CPT

## 2020-01-31 RX ORDER — DUPILUMAB 300 MG/2ML
300 INJECTION, SOLUTION SUBCUTANEOUS
Qty: 0 | Refills: 0 | Status: COMPLETED | OUTPATIENT
Start: 2020-01-29

## 2020-02-12 RX ADMIN — DUPILUMAB 0 MG/2ML: 300 INJECTION, SOLUTION SUBCUTANEOUS at 00:00

## 2020-02-14 ENCOUNTER — APPOINTMENT (OUTPATIENT)
Dept: PEDIATRIC ALLERGY IMMUNOLOGY | Facility: CLINIC | Age: 60
End: 2020-02-14
Payer: MEDICARE

## 2020-02-14 VITALS
HEIGHT: 65.98 IN | OXYGEN SATURATION: 97 % | DIASTOLIC BLOOD PRESSURE: 82 MMHG | HEART RATE: 103 BPM | SYSTOLIC BLOOD PRESSURE: 121 MMHG

## 2020-02-14 PROCEDURE — 96372 THER/PROPH/DIAG INJ SC/IM: CPT

## 2020-02-14 RX ORDER — DUPILUMAB 300 MG/2ML
300 INJECTION, SOLUTION SUBCUTANEOUS
Qty: 0 | Refills: 0 | Status: COMPLETED | OUTPATIENT
Start: 2020-02-12

## 2020-02-26 RX ADMIN — DUPILUMAB 0 MG/2ML: 300 INJECTION, SOLUTION SUBCUTANEOUS at 00:00

## 2020-03-06 ENCOUNTER — APPOINTMENT (OUTPATIENT)
Dept: PEDIATRIC ALLERGY IMMUNOLOGY | Facility: CLINIC | Age: 60
End: 2020-03-06
Payer: MEDICARE

## 2020-03-06 VITALS — DIASTOLIC BLOOD PRESSURE: 86 MMHG | HEART RATE: 93 BPM | OXYGEN SATURATION: 99 % | SYSTOLIC BLOOD PRESSURE: 131 MMHG

## 2020-03-06 PROCEDURE — 96372 THER/PROPH/DIAG INJ SC/IM: CPT

## 2020-03-06 RX ORDER — DUPILUMAB 300 MG/2ML
300 INJECTION, SOLUTION SUBCUTANEOUS
Qty: 0 | Refills: 0 | Status: COMPLETED | OUTPATIENT
Start: 2020-02-26

## 2020-03-11 RX ADMIN — DUPILUMAB 0 MG/2ML: 300 INJECTION, SOLUTION SUBCUTANEOUS at 00:00

## 2020-03-25 RX ADMIN — DUPILUMAB 0 MG/2ML: 300 INJECTION, SOLUTION SUBCUTANEOUS at 00:00

## 2020-03-27 ENCOUNTER — APPOINTMENT (OUTPATIENT)
Dept: PEDIATRIC ALLERGY IMMUNOLOGY | Facility: CLINIC | Age: 60
End: 2020-03-27

## 2020-04-08 RX ADMIN — DUPILUMAB 0 MG/2ML: 300 INJECTION, SOLUTION SUBCUTANEOUS at 00:00

## 2020-04-22 RX ADMIN — DUPILUMAB 0 MG/2ML: 300 INJECTION, SOLUTION SUBCUTANEOUS at 00:00

## 2020-04-29 ENCOUNTER — RX RENEWAL (OUTPATIENT)
Age: 60
End: 2020-04-29

## 2020-05-06 RX ADMIN — DUPILUMAB 0 MG/2ML: 300 INJECTION, SOLUTION SUBCUTANEOUS at 00:00

## 2020-06-12 ENCOUNTER — APPOINTMENT (OUTPATIENT)
Dept: PEDIATRIC ALLERGY IMMUNOLOGY | Facility: CLINIC | Age: 60
End: 2020-06-12
Payer: MEDICARE

## 2020-06-12 VITALS
TEMPERATURE: 98.8 F | WEIGHT: 206 LBS | HEART RATE: 94 BPM | DIASTOLIC BLOOD PRESSURE: 89 MMHG | HEIGHT: 65.98 IN | SYSTOLIC BLOOD PRESSURE: 135 MMHG | OXYGEN SATURATION: 94 % | BODY MASS INDEX: 33.11 KG/M2

## 2020-06-12 PROCEDURE — 96372 THER/PROPH/DIAG INJ SC/IM: CPT

## 2020-06-12 RX ORDER — DUPILUMAB 300 MG/2ML
300 INJECTION, SOLUTION SUBCUTANEOUS
Qty: 0 | Refills: 0 | Status: COMPLETED | OUTPATIENT
Start: 2020-05-06

## 2020-06-26 ENCOUNTER — APPOINTMENT (OUTPATIENT)
Dept: PEDIATRIC ALLERGY IMMUNOLOGY | Facility: CLINIC | Age: 60
End: 2020-06-26
Payer: MEDICARE

## 2020-06-26 VITALS
OXYGEN SATURATION: 93 % | TEMPERATURE: 99.1 F | HEART RATE: 84 BPM | SYSTOLIC BLOOD PRESSURE: 112 MMHG | DIASTOLIC BLOOD PRESSURE: 77 MMHG

## 2020-06-26 PROCEDURE — 96372 THER/PROPH/DIAG INJ SC/IM: CPT

## 2020-06-26 RX ORDER — DUPILUMAB 300 MG/2ML
300 INJECTION, SOLUTION SUBCUTANEOUS
Qty: 0 | Refills: 0 | Status: COMPLETED | OUTPATIENT
Start: 2020-04-22

## 2020-07-10 ENCOUNTER — APPOINTMENT (OUTPATIENT)
Dept: PEDIATRIC ALLERGY IMMUNOLOGY | Facility: CLINIC | Age: 60
End: 2020-07-10
Payer: MEDICARE

## 2020-07-10 VITALS — HEART RATE: 106 BPM

## 2020-07-10 PROCEDURE — 96372 THER/PROPH/DIAG INJ SC/IM: CPT

## 2020-07-10 RX ORDER — DUPILUMAB 300 MG/2ML
300 INJECTION, SOLUTION SUBCUTANEOUS
Qty: 0 | Refills: 0 | Status: COMPLETED | OUTPATIENT
Start: 2020-04-08

## 2020-07-24 ENCOUNTER — APPOINTMENT (OUTPATIENT)
Dept: PEDIATRIC ALLERGY IMMUNOLOGY | Facility: CLINIC | Age: 60
End: 2020-07-24
Payer: MEDICARE

## 2020-07-24 VITALS — HEART RATE: 116 BPM

## 2020-07-24 PROCEDURE — 96372 THER/PROPH/DIAG INJ SC/IM: CPT

## 2020-07-24 RX ORDER — DUPILUMAB 300 MG/2ML
300 INJECTION, SOLUTION SUBCUTANEOUS
Qty: 0 | Refills: 0 | Status: COMPLETED | OUTPATIENT
Start: 2020-03-25

## 2020-08-14 ENCOUNTER — APPOINTMENT (OUTPATIENT)
Dept: PEDIATRIC ALLERGY IMMUNOLOGY | Facility: CLINIC | Age: 60
End: 2020-08-14
Payer: MEDICARE

## 2020-08-14 VITALS
HEART RATE: 100 BPM | OXYGEN SATURATION: 96 % | SYSTOLIC BLOOD PRESSURE: 130 MMHG | DIASTOLIC BLOOD PRESSURE: 84 MMHG | TEMPERATURE: 98 F

## 2020-08-14 PROCEDURE — 96372 THER/PROPH/DIAG INJ SC/IM: CPT

## 2020-08-14 RX ORDER — DUPILUMAB 300 MG/2ML
300 INJECTION, SOLUTION SUBCUTANEOUS
Qty: 0 | Refills: 0 | Status: COMPLETED | OUTPATIENT
Start: 2020-08-14

## 2020-08-14 RX ORDER — DUPILUMAB 300 MG/2ML
300 INJECTION, SOLUTION SUBCUTANEOUS
Qty: 2 | Refills: 5 | Status: DISCONTINUED | COMMUNITY
Start: 2019-04-26 | End: 2020-08-14

## 2020-08-14 RX ORDER — DUPILUMAB 300 MG/2ML
300 INJECTION, SOLUTION SUBCUTANEOUS
Refills: 0 | Status: COMPLETED | OUTPATIENT
Start: 2020-08-14 | End: 1900-01-01

## 2020-08-14 RX ADMIN — DUPILUMAB 0 MG/2ML: 300 INJECTION, SOLUTION SUBCUTANEOUS at 00:00

## 2020-08-22 RX ADMIN — DUPILUMAB 0 MG/2ML: 300 INJECTION, SOLUTION SUBCUTANEOUS at 00:00

## 2020-08-28 ENCOUNTER — APPOINTMENT (OUTPATIENT)
Dept: PEDIATRIC ALLERGY IMMUNOLOGY | Facility: CLINIC | Age: 60
End: 2020-08-28
Payer: MEDICARE

## 2020-08-28 VITALS
WEIGHT: 206 LBS | OXYGEN SATURATION: 94 % | HEART RATE: 92 BPM | TEMPERATURE: 97.6 F | DIASTOLIC BLOOD PRESSURE: 81 MMHG | BODY MASS INDEX: 33.11 KG/M2 | HEIGHT: 65.98 IN | SYSTOLIC BLOOD PRESSURE: 117 MMHG

## 2020-08-28 PROCEDURE — 96372 THER/PROPH/DIAG INJ SC/IM: CPT

## 2020-08-28 RX ORDER — DUPILUMAB 300 MG/2ML
300 INJECTION, SOLUTION SUBCUTANEOUS
Qty: 0 | Refills: 0 | Status: COMPLETED | OUTPATIENT
Start: 2020-08-22

## 2020-09-05 RX ADMIN — DUPILUMAB 0 MG/2ML: 300 INJECTION, SOLUTION SUBCUTANEOUS at 00:00

## 2020-09-11 ENCOUNTER — APPOINTMENT (OUTPATIENT)
Dept: PEDIATRIC ALLERGY IMMUNOLOGY | Facility: CLINIC | Age: 60
End: 2020-09-11
Payer: MEDICARE

## 2020-09-11 VITALS
OXYGEN SATURATION: 96 % | SYSTOLIC BLOOD PRESSURE: 126 MMHG | TEMPERATURE: 97.3 F | DIASTOLIC BLOOD PRESSURE: 86 MMHG | HEART RATE: 93 BPM

## 2020-09-11 PROCEDURE — 96372 THER/PROPH/DIAG INJ SC/IM: CPT

## 2020-09-11 RX ORDER — DUPILUMAB 300 MG/2ML
300 INJECTION, SOLUTION SUBCUTANEOUS
Qty: 0 | Refills: 0 | Status: COMPLETED | OUTPATIENT
Start: 2020-09-05

## 2020-09-19 RX ADMIN — DUPILUMAB 0 MG/2ML: 300 INJECTION, SOLUTION SUBCUTANEOUS at 00:00

## 2020-09-28 ENCOUNTER — APPOINTMENT (OUTPATIENT)
Dept: PEDIATRIC ALLERGY IMMUNOLOGY | Facility: CLINIC | Age: 60
End: 2020-09-28

## 2020-10-02 ENCOUNTER — APPOINTMENT (OUTPATIENT)
Dept: PEDIATRIC ALLERGY IMMUNOLOGY | Facility: CLINIC | Age: 60
End: 2020-10-02
Payer: MEDICARE

## 2020-10-02 VITALS — RESPIRATION RATE: 16 BRPM | SYSTOLIC BLOOD PRESSURE: 121 MMHG | DIASTOLIC BLOOD PRESSURE: 86 MMHG | HEART RATE: 94 BPM

## 2020-10-02 PROCEDURE — 96372 THER/PROPH/DIAG INJ SC/IM: CPT

## 2020-10-02 RX ORDER — DUPILUMAB 300 MG/2ML
300 INJECTION, SOLUTION SUBCUTANEOUS
Qty: 0 | Refills: 0 | Status: COMPLETED | OUTPATIENT
Start: 2020-09-19

## 2020-10-03 RX ADMIN — DUPILUMAB 0 MG/2ML: 300 INJECTION, SOLUTION SUBCUTANEOUS at 00:00

## 2020-10-16 ENCOUNTER — APPOINTMENT (OUTPATIENT)
Dept: PEDIATRIC ALLERGY IMMUNOLOGY | Facility: CLINIC | Age: 60
End: 2020-10-16
Payer: MEDICARE

## 2020-10-16 VITALS — BODY MASS INDEX: 33.04 KG/M2 | HEART RATE: 102 BPM | WEIGHT: 204.59 LBS

## 2020-10-16 PROCEDURE — 96372 THER/PROPH/DIAG INJ SC/IM: CPT

## 2020-10-16 RX ORDER — DUPILUMAB 300 MG/2ML
300 INJECTION, SOLUTION SUBCUTANEOUS
Qty: 0 | Refills: 0 | Status: COMPLETED | OUTPATIENT
Start: 2020-10-03

## 2020-10-17 RX ADMIN — DUPILUMAB 0 MG/2ML: 300 INJECTION, SOLUTION SUBCUTANEOUS at 00:00

## 2020-10-30 ENCOUNTER — APPOINTMENT (OUTPATIENT)
Dept: PEDIATRIC ALLERGY IMMUNOLOGY | Facility: CLINIC | Age: 60
End: 2020-10-30
Payer: MEDICARE

## 2020-10-30 VITALS
WEIGHT: 206 LBS | SYSTOLIC BLOOD PRESSURE: 121 MMHG | TEMPERATURE: 97.6 F | OXYGEN SATURATION: 92 % | DIASTOLIC BLOOD PRESSURE: 77 MMHG | HEART RATE: 94 BPM | BODY MASS INDEX: 33.27 KG/M2

## 2020-10-30 PROCEDURE — 96372 THER/PROPH/DIAG INJ SC/IM: CPT

## 2020-10-30 RX ORDER — DUPILUMAB 300 MG/2ML
300 INJECTION, SOLUTION SUBCUTANEOUS
Qty: 0 | Refills: 0 | Status: COMPLETED | OUTPATIENT
Start: 2020-10-17

## 2020-10-31 RX ADMIN — DUPILUMAB 0 MG/2ML: 300 INJECTION, SOLUTION SUBCUTANEOUS at 00:00

## 2020-11-14 RX ADMIN — DUPILUMAB 0 MG/2ML: 300 INJECTION, SOLUTION SUBCUTANEOUS at 00:00

## 2020-11-20 ENCOUNTER — APPOINTMENT (OUTPATIENT)
Dept: PEDIATRIC ALLERGY IMMUNOLOGY | Facility: CLINIC | Age: 60
End: 2020-11-20
Payer: MEDICARE

## 2020-11-20 VITALS
RESPIRATION RATE: 16 BRPM | TEMPERATURE: 98.2 F | DIASTOLIC BLOOD PRESSURE: 85 MMHG | HEART RATE: 98 BPM | SYSTOLIC BLOOD PRESSURE: 126 MMHG

## 2020-11-20 PROCEDURE — 96372 THER/PROPH/DIAG INJ SC/IM: CPT

## 2020-11-20 RX ORDER — DUPILUMAB 300 MG/2ML
300 INJECTION, SOLUTION SUBCUTANEOUS
Qty: 0 | Refills: 0 | Status: COMPLETED | OUTPATIENT
Start: 2020-11-14

## 2020-11-28 RX ADMIN — DUPILUMAB 0 MG/2ML: 300 INJECTION, SOLUTION SUBCUTANEOUS at 00:00

## 2020-12-04 ENCOUNTER — APPOINTMENT (OUTPATIENT)
Dept: PEDIATRIC ALLERGY IMMUNOLOGY | Facility: CLINIC | Age: 60
End: 2020-12-04
Payer: MEDICARE

## 2020-12-04 VITALS
OXYGEN SATURATION: 93 % | SYSTOLIC BLOOD PRESSURE: 129 MMHG | BODY MASS INDEX: 32.99 KG/M2 | WEIGHT: 198 LBS | TEMPERATURE: 97.3 F | DIASTOLIC BLOOD PRESSURE: 90 MMHG | HEART RATE: 98 BPM | HEIGHT: 65 IN

## 2020-12-04 PROCEDURE — 96372 THER/PROPH/DIAG INJ SC/IM: CPT

## 2020-12-04 RX ORDER — DUPILUMAB 300 MG/2ML
300 INJECTION, SOLUTION SUBCUTANEOUS
Qty: 0 | Refills: 0 | Status: COMPLETED | OUTPATIENT
Start: 2020-03-11

## 2020-12-12 RX ADMIN — DUPILUMAB 0 MG/2ML: 300 INJECTION, SOLUTION SUBCUTANEOUS at 00:00

## 2020-12-18 ENCOUNTER — APPOINTMENT (OUTPATIENT)
Dept: PEDIATRIC ALLERGY IMMUNOLOGY | Facility: CLINIC | Age: 60
End: 2020-12-18

## 2020-12-26 RX ADMIN — DUPILUMAB 0 MG/2ML: 300 INJECTION, SOLUTION SUBCUTANEOUS at 00:00

## 2021-01-09 RX ADMIN — DUPILUMAB 0 MG/2ML: 300 INJECTION, SOLUTION SUBCUTANEOUS at 00:00

## 2021-01-15 ENCOUNTER — APPOINTMENT (OUTPATIENT)
Dept: PEDIATRIC ALLERGY IMMUNOLOGY | Facility: CLINIC | Age: 61
End: 2021-01-15
Payer: MEDICARE

## 2021-01-15 VITALS — TEMPERATURE: 94.8 F | HEIGHT: 65 IN

## 2021-01-15 PROCEDURE — 96372 THER/PROPH/DIAG INJ SC/IM: CPT

## 2021-01-15 RX ORDER — DUPILUMAB 300 MG/2ML
300 INJECTION, SOLUTION SUBCUTANEOUS
Qty: 0 | Refills: 0 | Status: COMPLETED | OUTPATIENT
Start: 2019-12-04

## 2021-01-23 RX ADMIN — DUPILUMAB 0 MG/2ML: 300 INJECTION, SOLUTION SUBCUTANEOUS at 00:00

## 2021-02-05 ENCOUNTER — APPOINTMENT (OUTPATIENT)
Dept: PEDIATRIC ALLERGY IMMUNOLOGY | Facility: CLINIC | Age: 61
End: 2021-02-05

## 2021-02-06 RX ADMIN — DUPILUMAB 0 MG/2ML: 300 INJECTION, SOLUTION SUBCUTANEOUS at 00:00

## 2021-02-20 RX ADMIN — DUPILUMAB 0 MG/2ML: 300 INJECTION, SOLUTION SUBCUTANEOUS at 00:00

## 2021-02-26 ENCOUNTER — APPOINTMENT (OUTPATIENT)
Dept: PEDIATRIC ALLERGY IMMUNOLOGY | Facility: CLINIC | Age: 61
End: 2021-02-26
Payer: MEDICARE

## 2021-02-26 VITALS
HEIGHT: 65 IN | BODY MASS INDEX: 32.15 KG/M2 | TEMPERATURE: 96.3 F | SYSTOLIC BLOOD PRESSURE: 119 MMHG | HEART RATE: 113 BPM | OXYGEN SATURATION: 94 % | DIASTOLIC BLOOD PRESSURE: 82 MMHG | WEIGHT: 193 LBS

## 2021-02-26 PROCEDURE — 96372 THER/PROPH/DIAG INJ SC/IM: CPT

## 2021-02-26 PROCEDURE — 99072 ADDL SUPL MATRL&STAF TM PHE: CPT

## 2021-03-06 RX ADMIN — DUPILUMAB 0 MG/2ML: 300 INJECTION, SOLUTION SUBCUTANEOUS at 00:00

## 2021-03-12 ENCOUNTER — APPOINTMENT (OUTPATIENT)
Dept: PEDIATRIC ALLERGY IMMUNOLOGY | Facility: CLINIC | Age: 61
End: 2021-03-12
Payer: MEDICARE

## 2021-03-12 VITALS
DIASTOLIC BLOOD PRESSURE: 89 MMHG | HEART RATE: 95 BPM | TEMPERATURE: 96.1 F | BODY MASS INDEX: 31.89 KG/M2 | HEIGHT: 65 IN | SYSTOLIC BLOOD PRESSURE: 126 MMHG | OXYGEN SATURATION: 97 % | WEIGHT: 191.38 LBS

## 2021-03-12 PROCEDURE — 96372 THER/PROPH/DIAG INJ SC/IM: CPT

## 2021-03-12 PROCEDURE — 99072 ADDL SUPL MATRL&STAF TM PHE: CPT

## 2021-03-12 RX ORDER — DUPILUMAB 300 MG/2ML
300 INJECTION, SOLUTION SUBCUTANEOUS
Qty: 0 | Refills: 0 | Status: COMPLETED | OUTPATIENT
Start: 2021-03-06

## 2021-03-20 RX ADMIN — DUPILUMAB 0 MG/2ML: 300 INJECTION, SOLUTION SUBCUTANEOUS at 00:00

## 2021-04-03 RX ADMIN — DUPILUMAB 0 MG/2ML: 300 INJECTION, SOLUTION SUBCUTANEOUS at 00:00

## 2021-04-16 ENCOUNTER — APPOINTMENT (OUTPATIENT)
Dept: PEDIATRIC ALLERGY IMMUNOLOGY | Facility: CLINIC | Age: 61
End: 2021-04-16
Payer: MEDICAID

## 2021-04-16 VITALS
HEART RATE: 109 BPM | OXYGEN SATURATION: 96 % | TEMPERATURE: 96.6 F | HEIGHT: 65 IN | WEIGHT: 197.38 LBS | SYSTOLIC BLOOD PRESSURE: 118 MMHG | BODY MASS INDEX: 32.89 KG/M2 | DIASTOLIC BLOOD PRESSURE: 78 MMHG

## 2021-04-16 PROCEDURE — 99072 ADDL SUPL MATRL&STAF TM PHE: CPT

## 2021-04-16 PROCEDURE — 96372 THER/PROPH/DIAG INJ SC/IM: CPT

## 2021-04-16 RX ORDER — DUPILUMAB 300 MG/2ML
300 INJECTION, SOLUTION SUBCUTANEOUS
Qty: 0 | Refills: 0 | Status: COMPLETED | OUTPATIENT
Start: 2021-01-09

## 2021-04-17 RX ADMIN — DUPILUMAB 0 MG/2ML: 300 INJECTION, SOLUTION SUBCUTANEOUS at 00:00

## 2021-05-01 RX ADMIN — DUPILUMAB 0 MG/2ML: 300 INJECTION, SOLUTION SUBCUTANEOUS at 00:00

## 2021-05-07 ENCOUNTER — APPOINTMENT (OUTPATIENT)
Dept: PEDIATRIC ALLERGY IMMUNOLOGY | Facility: CLINIC | Age: 61
End: 2021-05-07
Payer: MEDICAID

## 2021-05-07 VITALS — HEART RATE: 73 BPM

## 2021-05-07 PROCEDURE — 99072 ADDL SUPL MATRL&STAF TM PHE: CPT

## 2021-05-07 PROCEDURE — 96372 THER/PROPH/DIAG INJ SC/IM: CPT

## 2021-05-07 RX ORDER — DUPILUMAB 300 MG/2ML
300 INJECTION, SOLUTION SUBCUTANEOUS
Qty: 0 | Refills: 0 | Status: COMPLETED | OUTPATIENT
Start: 2021-04-17

## 2021-05-15 RX ADMIN — DUPILUMAB 0 MG/2ML: 300 INJECTION, SOLUTION SUBCUTANEOUS at 00:00

## 2021-05-21 ENCOUNTER — APPOINTMENT (OUTPATIENT)
Dept: PEDIATRIC ALLERGY IMMUNOLOGY | Facility: CLINIC | Age: 61
End: 2021-05-21

## 2021-05-29 RX ADMIN — DUPILUMAB 0 MG/2ML: 300 INJECTION, SOLUTION SUBCUTANEOUS at 00:00

## 2021-06-12 RX ADMIN — DUPILUMAB 0 MG/2ML: 300 INJECTION, SOLUTION SUBCUTANEOUS at 00:00

## 2021-06-18 ENCOUNTER — APPOINTMENT (OUTPATIENT)
Dept: PEDIATRIC ALLERGY IMMUNOLOGY | Facility: CLINIC | Age: 61
End: 2021-06-18

## 2021-06-26 RX ADMIN — DUPILUMAB 0 MG/2ML: 300 INJECTION, SOLUTION SUBCUTANEOUS at 00:00

## 2021-07-09 ENCOUNTER — APPOINTMENT (OUTPATIENT)
Dept: PEDIATRIC ALLERGY IMMUNOLOGY | Facility: CLINIC | Age: 61
End: 2021-07-09
Payer: MEDICARE

## 2021-07-09 ENCOUNTER — OUTPATIENT (OUTPATIENT)
Dept: OUTPATIENT SERVICES | Facility: HOSPITAL | Age: 61
LOS: 1 days | End: 2021-07-09
Payer: MEDICAID

## 2021-07-09 VITALS
OXYGEN SATURATION: 97 % | DIASTOLIC BLOOD PRESSURE: 75 MMHG | HEART RATE: 84 BPM | BODY MASS INDEX: 33.49 KG/M2 | SYSTOLIC BLOOD PRESSURE: 110 MMHG | WEIGHT: 201 LBS | TEMPERATURE: 96.3 F | HEIGHT: 65 IN

## 2021-07-09 DIAGNOSIS — J45.909 UNSPECIFIED ASTHMA, UNCOMPLICATED: ICD-10-CM

## 2021-07-09 PROCEDURE — 96372 THER/PROPH/DIAG INJ SC/IM: CPT

## 2021-07-09 PROCEDURE — ZZZZZ: CPT

## 2021-07-09 RX ORDER — DUPILUMAB 300 MG/2ML
300 INJECTION, SOLUTION SUBCUTANEOUS
Qty: 0 | Refills: 0 | Status: COMPLETED | OUTPATIENT
Start: 2021-06-26

## 2021-07-10 RX ADMIN — DUPILUMAB 0 MG/2ML: 300 INJECTION, SOLUTION SUBCUTANEOUS at 00:00

## 2021-07-23 ENCOUNTER — APPOINTMENT (OUTPATIENT)
Dept: PEDIATRIC ALLERGY IMMUNOLOGY | Facility: CLINIC | Age: 61
End: 2021-07-23

## 2021-07-23 ENCOUNTER — APPOINTMENT (OUTPATIENT)
Dept: PEDIATRIC ALLERGY IMMUNOLOGY | Facility: CLINIC | Age: 61
End: 2021-07-23
Payer: MEDICAID

## 2021-07-23 VITALS — HEART RATE: 80 BPM | OXYGEN SATURATION: 98 %

## 2021-07-23 PROCEDURE — 96372 THER/PROPH/DIAG INJ SC/IM: CPT

## 2021-07-23 RX ORDER — DUPILUMAB 300 MG/2ML
300 INJECTION, SOLUTION SUBCUTANEOUS
Qty: 0 | Refills: 0 | Status: COMPLETED | OUTPATIENT
Start: 2021-07-10

## 2021-07-24 RX ADMIN — DUPILUMAB 0 MG/2ML: 300 INJECTION, SOLUTION SUBCUTANEOUS at 00:00

## 2021-07-30 ENCOUNTER — APPOINTMENT (OUTPATIENT)
Dept: PEDIATRIC ALLERGY IMMUNOLOGY | Facility: CLINIC | Age: 61
End: 2021-07-30

## 2021-08-06 ENCOUNTER — APPOINTMENT (OUTPATIENT)
Dept: PEDIATRIC ALLERGY IMMUNOLOGY | Facility: CLINIC | Age: 61
End: 2021-08-06

## 2021-08-07 RX ADMIN — DUPILUMAB 0 MG/2ML: 300 INJECTION, SOLUTION SUBCUTANEOUS at 00:00

## 2021-08-20 ENCOUNTER — OUTPATIENT (OUTPATIENT)
Dept: OUTPATIENT SERVICES | Facility: HOSPITAL | Age: 61
LOS: 1 days | End: 2021-08-20
Payer: MEDICAID

## 2021-08-20 ENCOUNTER — APPOINTMENT (OUTPATIENT)
Dept: PEDIATRIC ALLERGY IMMUNOLOGY | Facility: CLINIC | Age: 61
End: 2021-08-20
Payer: MEDICAID

## 2021-08-20 VITALS — OXYGEN SATURATION: 96 % | HEART RATE: 94 BPM

## 2021-08-20 DIAGNOSIS — J45.909 UNSPECIFIED ASTHMA, UNCOMPLICATED: ICD-10-CM

## 2021-08-20 PROCEDURE — 96372 THER/PROPH/DIAG INJ SC/IM: CPT

## 2021-08-20 PROCEDURE — ZZZZZ: CPT

## 2021-08-20 RX ORDER — DUPILUMAB 300 MG/2ML
300 INJECTION, SOLUTION SUBCUTANEOUS
Qty: 0 | Refills: 0 | Status: COMPLETED | OUTPATIENT
Start: 2021-08-07

## 2021-09-10 ENCOUNTER — APPOINTMENT (OUTPATIENT)
Dept: PEDIATRIC ALLERGY IMMUNOLOGY | Facility: CLINIC | Age: 61
End: 2021-09-10
Payer: MEDICAID

## 2021-09-10 ENCOUNTER — NON-APPOINTMENT (OUTPATIENT)
Age: 61
End: 2021-09-10

## 2021-09-10 VITALS
SYSTOLIC BLOOD PRESSURE: 137 MMHG | OXYGEN SATURATION: 97 % | HEART RATE: 94 BPM | TEMPERATURE: 97.1 F | DIASTOLIC BLOOD PRESSURE: 85 MMHG | HEIGHT: 65 IN

## 2021-09-10 PROCEDURE — 96372 THER/PROPH/DIAG INJ SC/IM: CPT

## 2021-09-10 RX ORDER — DUPILUMAB 300 MG/2ML
300 INJECTION, SOLUTION SUBCUTANEOUS
Qty: 0 | Refills: 0 | Status: COMPLETED | OUTPATIENT
Start: 2021-07-24

## 2021-09-24 ENCOUNTER — APPOINTMENT (OUTPATIENT)
Dept: PEDIATRIC ALLERGY IMMUNOLOGY | Facility: CLINIC | Age: 61
End: 2021-09-24
Payer: MEDICAID

## 2021-09-24 VITALS
TEMPERATURE: 96.2 F | OXYGEN SATURATION: 98 % | HEART RATE: 91 BPM | DIASTOLIC BLOOD PRESSURE: 85 MMHG | HEIGHT: 65 IN | SYSTOLIC BLOOD PRESSURE: 125 MMHG

## 2021-09-24 DIAGNOSIS — J45.50 SEVERE PERSISTENT ASTHMA, UNCOMPLICATED: ICD-10-CM

## 2021-09-24 PROCEDURE — 96372 THER/PROPH/DIAG INJ SC/IM: CPT

## 2021-09-24 RX ORDER — DUPILUMAB 300 MG/2ML
300 INJECTION, SOLUTION SUBCUTANEOUS
Qty: 0 | Refills: 0 | Status: COMPLETED | OUTPATIENT
Start: 2019-11-06

## 2021-10-08 ENCOUNTER — APPOINTMENT (OUTPATIENT)
Dept: PEDIATRIC ALLERGY IMMUNOLOGY | Facility: CLINIC | Age: 61
End: 2021-10-08
Payer: MEDICAID

## 2021-10-08 VITALS
HEIGHT: 65 IN | HEART RATE: 104 BPM | SYSTOLIC BLOOD PRESSURE: 117 MMHG | DIASTOLIC BLOOD PRESSURE: 73 MMHG | OXYGEN SATURATION: 95 % | TEMPERATURE: 96.3 F

## 2021-10-08 PROCEDURE — 96372 THER/PROPH/DIAG INJ SC/IM: CPT

## 2021-10-08 RX ORDER — DUPILUMAB 300 MG/2ML
300 INJECTION, SOLUTION SUBCUTANEOUS
Qty: 0 | Refills: 0 | Status: COMPLETED | OUTPATIENT
Start: 2021-06-12

## 2021-10-22 ENCOUNTER — APPOINTMENT (OUTPATIENT)
Dept: PEDIATRIC ALLERGY IMMUNOLOGY | Facility: CLINIC | Age: 61
End: 2021-10-22

## 2021-10-28 DIAGNOSIS — J30.9 ALLERGIC RHINITIS, UNSPECIFIED: ICD-10-CM

## 2021-10-31 DIAGNOSIS — J30.9 ALLERGIC RHINITIS, UNSPECIFIED: ICD-10-CM

## 2021-11-19 ENCOUNTER — APPOINTMENT (OUTPATIENT)
Dept: PEDIATRIC ALLERGY IMMUNOLOGY | Facility: CLINIC | Age: 61
End: 2021-11-19

## 2021-12-10 ENCOUNTER — APPOINTMENT (OUTPATIENT)
Dept: PEDIATRIC ALLERGY IMMUNOLOGY | Facility: CLINIC | Age: 61
End: 2021-12-10
Payer: MEDICARE

## 2021-12-10 VITALS
HEART RATE: 88 BPM | TEMPERATURE: 96.98 F | OXYGEN SATURATION: 98 % | HEIGHT: 65 IN | SYSTOLIC BLOOD PRESSURE: 138 MMHG | DIASTOLIC BLOOD PRESSURE: 91 MMHG | BODY MASS INDEX: 34.16 KG/M2 | WEIGHT: 205 LBS

## 2021-12-10 PROCEDURE — 96372 THER/PROPH/DIAG INJ SC/IM: CPT

## 2021-12-10 RX ORDER — DUPILUMAB 300 MG/2ML
300 INJECTION, SOLUTION SUBCUTANEOUS
Qty: 0 | Refills: 0 | Status: COMPLETED | OUTPATIENT
Start: 2021-05-29

## 2022-03-04 ENCOUNTER — APPOINTMENT (OUTPATIENT)
Dept: PEDIATRIC ALLERGY IMMUNOLOGY | Facility: CLINIC | Age: 62
End: 2022-03-04

## 2025-08-12 ENCOUNTER — INPATIENT (INPATIENT)
Facility: HOSPITAL | Age: 65
LOS: 0 days | Discharge: HOME HEALTH | DRG: 313 | End: 2025-08-13
Attending: INTERNAL MEDICINE | Admitting: INTERNAL MEDICINE
Payer: COMMERCIAL

## 2025-08-12 ENCOUNTER — RESULT REVIEW (OUTPATIENT)
Age: 65
End: 2025-08-12

## 2025-08-12 VITALS
HEIGHT: 67 IN | TEMPERATURE: 99 F | HEART RATE: 84 BPM | OXYGEN SATURATION: 95 % | DIASTOLIC BLOOD PRESSURE: 76 MMHG | WEIGHT: 199.96 LBS | SYSTOLIC BLOOD PRESSURE: 123 MMHG | RESPIRATION RATE: 18 BRPM

## 2025-08-12 DIAGNOSIS — E11.9 TYPE 2 DIABETES MELLITUS WITHOUT COMPLICATIONS: ICD-10-CM

## 2025-08-12 DIAGNOSIS — R07.9 CHEST PAIN, UNSPECIFIED: ICD-10-CM

## 2025-08-12 DIAGNOSIS — I10 ESSENTIAL (PRIMARY) HYPERTENSION: ICD-10-CM

## 2025-08-12 DIAGNOSIS — E78.5 HYPERLIPIDEMIA, UNSPECIFIED: ICD-10-CM

## 2025-08-12 LAB
APPEARANCE UR: CLEAR — SIGNIFICANT CHANGE UP
APTT BLD: 25.7 SEC — LOW (ref 26.1–36.8)
B-OH-BUTYR SERPL-SCNC: 0.1 MMOL/L — SIGNIFICANT CHANGE UP
BACTERIA # UR AUTO: NEGATIVE /HPF — SIGNIFICANT CHANGE UP
BASOPHILS # BLD AUTO: 0.05 K/UL — SIGNIFICANT CHANGE UP (ref 0–0.2)
BASOPHILS NFR BLD AUTO: 0.5 % — SIGNIFICANT CHANGE UP (ref 0–2)
BILIRUB UR-MCNC: NEGATIVE — SIGNIFICANT CHANGE UP
CAST: 2 /LPF — SIGNIFICANT CHANGE UP (ref 0–4)
CK MB BLD-MCNC: 0.8 % — SIGNIFICANT CHANGE UP (ref 0–3.5)
CK MB CFR SERPL CALC: 2.6 NG/ML — SIGNIFICANT CHANGE UP (ref 0–6.7)
CK SERPL-CCNC: 340 U/L — HIGH (ref 30–200)
COLOR SPEC: YELLOW — SIGNIFICANT CHANGE UP
CRP SERPL-MCNC: 5 MG/L — HIGH (ref 0–4)
DIFF PNL FLD: NEGATIVE — SIGNIFICANT CHANGE UP
EOSINOPHIL # BLD AUTO: 0.07 K/UL — SIGNIFICANT CHANGE UP (ref 0–0.5)
EOSINOPHIL NFR BLD AUTO: 0.6 % — SIGNIFICANT CHANGE UP (ref 0–6)
ERYTHROCYTE [SEDIMENTATION RATE] IN BLOOD: 48 MM/HR — HIGH (ref 0–15)
FLUAV AG NPH QL: SIGNIFICANT CHANGE UP
FLUBV AG NPH QL: SIGNIFICANT CHANGE UP
GAS PNL BLDV: SIGNIFICANT CHANGE UP
GAS PNL BLDV: SIGNIFICANT CHANGE UP
GLUCOSE BLDC GLUCOMTR-MCNC: 254 MG/DL — HIGH (ref 70–99)
GLUCOSE BLDC GLUCOMTR-MCNC: 299 MG/DL — HIGH (ref 70–99)
GLUCOSE BLDC GLUCOMTR-MCNC: 353 MG/DL — HIGH (ref 70–99)
GLUCOSE BLDC GLUCOMTR-MCNC: 452 MG/DL — CRITICAL HIGH (ref 70–99)
GLUCOSE UR QL: 100 MG/DL
HCT VFR BLD CALC: 39.2 % — SIGNIFICANT CHANGE UP (ref 39–50)
HGB BLD-MCNC: 12.6 G/DL — LOW (ref 13–17)
IMM GRANULOCYTES # BLD AUTO: 0.05 K/UL — SIGNIFICANT CHANGE UP (ref 0–0.07)
IMM GRANULOCYTES NFR BLD AUTO: 0.5 % — SIGNIFICANT CHANGE UP (ref 0–0.9)
INR BLD: 1.04 RATIO — SIGNIFICANT CHANGE UP (ref 0.85–1.16)
KETONES UR QL: NEGATIVE MG/DL — SIGNIFICANT CHANGE UP
LEUKOCYTE ESTERASE UR-ACNC: ABNORMAL
LIDOCAIN IGE QN: 35 U/L — SIGNIFICANT CHANGE UP (ref 7–60)
LYMPHOCYTES # BLD AUTO: 1.01 K/UL — SIGNIFICANT CHANGE UP (ref 1–3.3)
LYMPHOCYTES NFR BLD AUTO: 9.2 % — LOW (ref 13–44)
MCHC RBC-ENTMCNC: 25.8 PG — LOW (ref 27–34)
MCHC RBC-ENTMCNC: 32.1 G/DL — SIGNIFICANT CHANGE UP (ref 32–36)
MCV RBC AUTO: 80.2 FL — SIGNIFICANT CHANGE UP (ref 80–100)
MONOCYTES # BLD AUTO: 0.8 K/UL — SIGNIFICANT CHANGE UP (ref 0–0.9)
MONOCYTES NFR BLD AUTO: 7.3 % — SIGNIFICANT CHANGE UP (ref 2–14)
NEUTROPHILS # BLD AUTO: 8.97 K/UL — HIGH (ref 1.8–7.4)
NEUTROPHILS NFR BLD AUTO: 81.9 % — HIGH (ref 43–77)
NITRITE UR-MCNC: NEGATIVE — SIGNIFICANT CHANGE UP
NRBC # BLD AUTO: 0 K/UL — SIGNIFICANT CHANGE UP (ref 0–0)
NRBC # FLD: 0 K/UL — SIGNIFICANT CHANGE UP (ref 0–0)
NRBC BLD AUTO-RTO: 0 /100 WBCS — SIGNIFICANT CHANGE UP (ref 0–0)
PH UR: 6 — SIGNIFICANT CHANGE UP (ref 5–8)
PLATELET # BLD AUTO: 191 K/UL — SIGNIFICANT CHANGE UP (ref 150–400)
PMV BLD: 10.4 FL — SIGNIFICANT CHANGE UP (ref 7–13)
PROCALCITONIN SERPL-MCNC: 0.08 NG/ML — SIGNIFICANT CHANGE UP (ref 0.02–0.1)
PROT UR-MCNC: NEGATIVE MG/DL — SIGNIFICANT CHANGE UP
PROTHROM AB SERPL-ACNC: 11.9 SEC — SIGNIFICANT CHANGE UP (ref 9.9–13.4)
RBC # BLD: 4.89 M/UL — SIGNIFICANT CHANGE UP (ref 4.2–5.8)
RBC # FLD: 13.4 % — SIGNIFICANT CHANGE UP (ref 10.3–14.5)
RBC CASTS # UR COMP ASSIST: 0 /HPF — SIGNIFICANT CHANGE UP (ref 0–4)
REVIEW: SIGNIFICANT CHANGE UP
RSV RNA NPH QL NAA+NON-PROBE: SIGNIFICANT CHANGE UP
SARS-COV-2 RNA SPEC QL NAA+PROBE: SIGNIFICANT CHANGE UP
SOURCE RESPIRATORY: SIGNIFICANT CHANGE UP
SP GR SPEC: 1.01 — SIGNIFICANT CHANGE UP (ref 1–1.03)
SQUAMOUS # UR AUTO: 1 /HPF — SIGNIFICANT CHANGE UP (ref 0–5)
TROPONIN T, HIGH SENSITIVITY RESULT: 14 NG/L — SIGNIFICANT CHANGE UP (ref 0–51)
TSH SERPL-MCNC: 1.72 UIU/ML — SIGNIFICANT CHANGE UP (ref 0.27–4.2)
UROBILINOGEN FLD QL: 0.2 MG/DL — SIGNIFICANT CHANGE UP (ref 0.2–1)
WBC # BLD: 10.95 K/UL — HIGH (ref 3.8–10.5)
WBC # FLD AUTO: 10.95 K/UL — HIGH (ref 3.8–10.5)
WBC UR QL: 2 /HPF — SIGNIFICANT CHANGE UP (ref 0–5)
YEAST-LIKE CELLS: PRESENT

## 2025-08-12 PROCEDURE — 83735 ASSAY OF MAGNESIUM: CPT

## 2025-08-12 PROCEDURE — 85610 PROTHROMBIN TIME: CPT

## 2025-08-12 PROCEDURE — 71275 CT ANGIOGRAPHY CHEST: CPT

## 2025-08-12 PROCEDURE — 84132 ASSAY OF SERUM POTASSIUM: CPT

## 2025-08-12 PROCEDURE — 82550 ASSAY OF CK (CPK): CPT

## 2025-08-12 PROCEDURE — 83690 ASSAY OF LIPASE: CPT

## 2025-08-12 PROCEDURE — 82803 BLOOD GASES ANY COMBINATION: CPT

## 2025-08-12 PROCEDURE — 85014 HEMATOCRIT: CPT

## 2025-08-12 PROCEDURE — 93010 ELECTROCARDIOGRAM REPORT: CPT

## 2025-08-12 PROCEDURE — 84484 ASSAY OF TROPONIN QUANT: CPT

## 2025-08-12 PROCEDURE — 82947 ASSAY GLUCOSE BLOOD QUANT: CPT

## 2025-08-12 PROCEDURE — 71045 X-RAY EXAM CHEST 1 VIEW: CPT | Mod: 26

## 2025-08-12 PROCEDURE — 84145 PROCALCITONIN (PCT): CPT

## 2025-08-12 PROCEDURE — 83605 ASSAY OF LACTIC ACID: CPT

## 2025-08-12 PROCEDURE — 84443 ASSAY THYROID STIM HORMONE: CPT

## 2025-08-12 PROCEDURE — 87040 BLOOD CULTURE FOR BACTERIA: CPT

## 2025-08-12 PROCEDURE — 85652 RBC SED RATE AUTOMATED: CPT

## 2025-08-12 PROCEDURE — 93005 ELECTROCARDIOGRAM TRACING: CPT

## 2025-08-12 PROCEDURE — 82010 KETONE BODYS QUAN: CPT

## 2025-08-12 PROCEDURE — 85730 THROMBOPLASTIN TIME PARTIAL: CPT

## 2025-08-12 PROCEDURE — 74177 CT ABD & PELVIS W/CONTRAST: CPT

## 2025-08-12 PROCEDURE — 86140 C-REACTIVE PROTEIN: CPT

## 2025-08-12 PROCEDURE — 85018 HEMOGLOBIN: CPT

## 2025-08-12 PROCEDURE — 82962 GLUCOSE BLOOD TEST: CPT

## 2025-08-12 PROCEDURE — 93306 TTE W/DOPPLER COMPLETE: CPT | Mod: 26

## 2025-08-12 PROCEDURE — 82435 ASSAY OF BLOOD CHLORIDE: CPT

## 2025-08-12 PROCEDURE — 87637 SARSCOV2&INF A&B&RSV AMP PRB: CPT

## 2025-08-12 PROCEDURE — 36415 COLL VENOUS BLD VENIPUNCTURE: CPT

## 2025-08-12 PROCEDURE — 74177 CT ABD & PELVIS W/CONTRAST: CPT | Mod: 26

## 2025-08-12 PROCEDURE — 85025 COMPLETE CBC W/AUTO DIFF WBC: CPT

## 2025-08-12 PROCEDURE — 80053 COMPREHEN METABOLIC PANEL: CPT

## 2025-08-12 PROCEDURE — 99285 EMERGENCY DEPT VISIT HI MDM: CPT | Mod: 25

## 2025-08-12 PROCEDURE — 84295 ASSAY OF SERUM SODIUM: CPT

## 2025-08-12 PROCEDURE — 93356 MYOCRD STRAIN IMG SPCKL TRCK: CPT

## 2025-08-12 PROCEDURE — 71275 CT ANGIOGRAPHY CHEST: CPT | Mod: 26

## 2025-08-12 PROCEDURE — 82330 ASSAY OF CALCIUM: CPT

## 2025-08-12 PROCEDURE — 71045 X-RAY EXAM CHEST 1 VIEW: CPT

## 2025-08-12 PROCEDURE — 84100 ASSAY OF PHOSPHORUS: CPT

## 2025-08-12 PROCEDURE — 81001 URINALYSIS AUTO W/SCOPE: CPT

## 2025-08-12 PROCEDURE — 82553 CREATINE MB FRACTION: CPT

## 2025-08-12 RX ORDER — LINACLOTIDE 290 UG/1
1 CAPSULE, GELATIN COATED ORAL
Refills: 0 | DISCHARGE

## 2025-08-12 RX ORDER — GABAPENTIN 400 MG/1
1 CAPSULE ORAL
Refills: 0 | DISCHARGE

## 2025-08-12 RX ORDER — CYANOCOBALAMIN 1000 UG/ML
1 INJECTION INTRAMUSCULAR; SUBCUTANEOUS
Refills: 0 | DISCHARGE

## 2025-08-12 RX ORDER — SACUBITRIL AND VALSARTAN 6; 6 MG/1; MG/1
1 PELLET ORAL
Refills: 0 | DISCHARGE

## 2025-08-12 RX ORDER — DEXTROSE 50 % IN WATER 50 %
15 SYRINGE (ML) INTRAVENOUS ONCE
Refills: 0 | Status: DISCONTINUED | OUTPATIENT
Start: 2025-08-12 | End: 2025-08-13

## 2025-08-12 RX ORDER — RIVAROXABAN 10 MG/1
20 TABLET, FILM COATED ORAL
Refills: 0 | Status: DISCONTINUED | OUTPATIENT
Start: 2025-08-12 | End: 2025-08-13

## 2025-08-12 RX ORDER — SPIRONOLACTONE 25 MG
1 TABLET ORAL
Refills: 0 | DISCHARGE

## 2025-08-12 RX ORDER — SEMAGLUTIDE 1 MG/.5ML
0.5 INJECTION, SOLUTION SUBCUTANEOUS
Refills: 0 | DISCHARGE

## 2025-08-12 RX ORDER — DEXTROSE 50 % IN WATER 50 %
25 SYRINGE (ML) INTRAVENOUS ONCE
Refills: 0 | Status: DISCONTINUED | OUTPATIENT
Start: 2025-08-12 | End: 2025-08-13

## 2025-08-12 RX ORDER — KETOCONAZOLE 20 MG/G
1 CREAM TOPICAL
Refills: 0 | DISCHARGE

## 2025-08-12 RX ORDER — ACETAMINOPHEN 500 MG/5ML
1000 LIQUID (ML) ORAL ONCE
Refills: 0 | Status: COMPLETED | OUTPATIENT
Start: 2025-08-12 | End: 2025-08-12

## 2025-08-12 RX ORDER — SODIUM CHLORIDE 9 G/1000ML
1000 INJECTION, SOLUTION INTRAVENOUS
Refills: 0 | Status: DISCONTINUED | OUTPATIENT
Start: 2025-08-12 | End: 2025-08-13

## 2025-08-12 RX ORDER — ASPIRIN 325 MG
1 TABLET ORAL
Refills: 0 | DISCHARGE

## 2025-08-12 RX ORDER — ROSUVASTATIN CALCIUM 20 MG/1
1 TABLET, FILM COATED ORAL
Refills: 0 | DISCHARGE

## 2025-08-12 RX ORDER — ROSUVASTATIN CALCIUM 20 MG/1
40 TABLET, FILM COATED ORAL AT BEDTIME
Refills: 0 | Status: DISCONTINUED | OUTPATIENT
Start: 2025-08-12 | End: 2025-08-13

## 2025-08-12 RX ORDER — ISOSORBIDE MONONITRATE 60 MG/1
30 TABLET, EXTENDED RELEASE ORAL DAILY
Refills: 0 | Status: DISCONTINUED | OUTPATIENT
Start: 2025-08-12 | End: 2025-08-13

## 2025-08-12 RX ORDER — DAPAGLIFLOZIN 5 MG/1
1 TABLET, FILM COATED ORAL
Refills: 0 | DISCHARGE

## 2025-08-12 RX ORDER — METFORMIN HYDROCHLORIDE 850 MG/1
1 TABLET ORAL
Refills: 0 | DISCHARGE

## 2025-08-12 RX ORDER — INSULIN ASPART 100 [IU]/ML
0 INJECTION, SOLUTION INTRAVENOUS; SUBCUTANEOUS
Refills: 0 | DISCHARGE

## 2025-08-12 RX ORDER — METHOTREXATE 25 MG/ML
10 INJECTION, SOLUTION INTRA-ARTERIAL; INTRAMUSCULAR; INTRATHECAL; INTRAVENOUS
Refills: 0 | DISCHARGE

## 2025-08-12 RX ORDER — EZETIMIBE 10 MG/1
1 TABLET ORAL
Refills: 0 | DISCHARGE

## 2025-08-12 RX ORDER — AMMONIUM LACTATE 12 %
1 LOTION (ML) TOPICAL
Refills: 0 | DISCHARGE

## 2025-08-12 RX ORDER — MOMETASONE FUROATE AND FORMOTEROL FUMARATE DIHYDRATE 50; 5 UG/1; UG/1
2 AEROSOL RESPIRATORY (INHALATION)
Refills: 0 | DISCHARGE

## 2025-08-12 RX ORDER — BUMETANIDE 1 MG/1
1 TABLET ORAL
Refills: 0 | DISCHARGE

## 2025-08-12 RX ORDER — PIPERACILLIN-TAZO-DEXTROSE,ISO 3.375G/5
3.38 IV SOLUTION, PIGGYBACK PREMIX FROZEN(ML) INTRAVENOUS ONCE
Refills: 0 | Status: COMPLETED | OUTPATIENT
Start: 2025-08-12 | End: 2025-08-12

## 2025-08-12 RX ORDER — METOPROLOL SUCCINATE 50 MG/1
12.5 TABLET, EXTENDED RELEASE ORAL
Refills: 0 | Status: DISCONTINUED | OUTPATIENT
Start: 2025-08-12 | End: 2025-08-13

## 2025-08-12 RX ORDER — FOLIC ACID 1 MG/1
1 TABLET ORAL
Refills: 0 | DISCHARGE

## 2025-08-12 RX ORDER — DULOXETINE 20 MG/1
1 CAPSULE, DELAYED RELEASE ORAL
Refills: 0 | DISCHARGE

## 2025-08-12 RX ORDER — ERGOCALCIFEROL 1.25 MG/1
1 CAPSULE ORAL
Refills: 0 | DISCHARGE

## 2025-08-12 RX ORDER — DEXTROSE 50 % IN WATER 50 %
12.5 SYRINGE (ML) INTRAVENOUS ONCE
Refills: 0 | Status: DISCONTINUED | OUTPATIENT
Start: 2025-08-12 | End: 2025-08-13

## 2025-08-12 RX ORDER — GLUCAGON 3 MG/1
1 POWDER NASAL ONCE
Refills: 0 | Status: DISCONTINUED | OUTPATIENT
Start: 2025-08-12 | End: 2025-08-13

## 2025-08-12 RX ORDER — INSULIN LISPRO 100 U/ML
INJECTION, SOLUTION INTRAVENOUS; SUBCUTANEOUS
Refills: 0 | Status: DISCONTINUED | OUTPATIENT
Start: 2025-08-12 | End: 2025-08-13

## 2025-08-12 RX ORDER — INSULIN DEGLUDEC 100 U/ML
25 INJECTION, SOLUTION SUBCUTANEOUS
Refills: 0 | DISCHARGE

## 2025-08-12 RX ORDER — INSULIN LISPRO 100 U/ML
INJECTION, SOLUTION INTRAVENOUS; SUBCUTANEOUS AT BEDTIME
Refills: 0 | Status: DISCONTINUED | OUTPATIENT
Start: 2025-08-12 | End: 2025-08-13

## 2025-08-12 RX ADMIN — INSULIN LISPRO 5: 100 INJECTION, SOLUTION INTRAVENOUS; SUBCUTANEOUS at 17:21

## 2025-08-12 RX ADMIN — INSULIN LISPRO 3: 100 INJECTION, SOLUTION INTRAVENOUS; SUBCUTANEOUS at 11:51

## 2025-08-12 RX ADMIN — INSULIN LISPRO 1: 100 INJECTION, SOLUTION INTRAVENOUS; SUBCUTANEOUS at 21:59

## 2025-08-12 RX ADMIN — ROSUVASTATIN CALCIUM 40 MILLIGRAM(S): 20 TABLET, FILM COATED ORAL at 22:06

## 2025-08-12 RX ADMIN — Medication 1000 MILLIGRAM(S): at 11:26

## 2025-08-12 RX ADMIN — Medication 400 MILLIGRAM(S): at 04:32

## 2025-08-12 RX ADMIN — Medication 200 GRAM(S): at 05:31

## 2025-08-12 RX ADMIN — RIVAROXABAN 20 MILLIGRAM(S): 10 TABLET, FILM COATED ORAL at 17:21

## 2025-08-12 RX ADMIN — Medication 500 MILLILITER(S): at 04:30

## 2025-08-12 RX ADMIN — METOPROLOL SUCCINATE 12.5 MILLIGRAM(S): 50 TABLET, EXTENDED RELEASE ORAL at 17:21

## 2025-08-12 RX ADMIN — ISOSORBIDE MONONITRATE 30 MILLIGRAM(S): 60 TABLET, EXTENDED RELEASE ORAL at 11:53

## 2025-08-13 ENCOUNTER — TRANSCRIPTION ENCOUNTER (OUTPATIENT)
Age: 65
End: 2025-08-13

## 2025-08-13 VITALS
SYSTOLIC BLOOD PRESSURE: 114 MMHG | TEMPERATURE: 98 F | OXYGEN SATURATION: 97 % | DIASTOLIC BLOOD PRESSURE: 72 MMHG | HEART RATE: 84 BPM | RESPIRATION RATE: 18 BRPM

## 2025-08-13 LAB
A1C WITH ESTIMATED AVERAGE GLUCOSE RESULT: 11.2 % — HIGH (ref 4–5.6)
ESTIMATED AVERAGE GLUCOSE: 275 MG/DL — HIGH (ref 68–114)
GLUCOSE BLDC GLUCOMTR-MCNC: 224 MG/DL — HIGH (ref 70–99)
GLUCOSE BLDC GLUCOMTR-MCNC: 232 MG/DL — HIGH (ref 70–99)
GLUCOSE BLDC GLUCOMTR-MCNC: 269 MG/DL — HIGH (ref 70–99)
GLUCOSE BLDC GLUCOMTR-MCNC: 329 MG/DL — HIGH (ref 70–99)

## 2025-08-13 PROCEDURE — 87637 SARSCOV2&INF A&B&RSV AMP PRB: CPT

## 2025-08-13 PROCEDURE — 96375 TX/PRO/DX INJ NEW DRUG ADDON: CPT

## 2025-08-13 PROCEDURE — 85014 HEMATOCRIT: CPT

## 2025-08-13 PROCEDURE — 93005 ELECTROCARDIOGRAM TRACING: CPT

## 2025-08-13 PROCEDURE — 71045 X-RAY EXAM CHEST 1 VIEW: CPT

## 2025-08-13 PROCEDURE — 83036 HEMOGLOBIN GLYCOSYLATED A1C: CPT

## 2025-08-13 PROCEDURE — 84132 ASSAY OF SERUM POTASSIUM: CPT

## 2025-08-13 PROCEDURE — 83605 ASSAY OF LACTIC ACID: CPT

## 2025-08-13 PROCEDURE — 93356 MYOCRD STRAIN IMG SPCKL TRCK: CPT

## 2025-08-13 PROCEDURE — 84484 ASSAY OF TROPONIN QUANT: CPT

## 2025-08-13 PROCEDURE — 84145 PROCALCITONIN (PCT): CPT

## 2025-08-13 PROCEDURE — 85652 RBC SED RATE AUTOMATED: CPT

## 2025-08-13 PROCEDURE — 82803 BLOOD GASES ANY COMBINATION: CPT

## 2025-08-13 PROCEDURE — 93306 TTE W/DOPPLER COMPLETE: CPT

## 2025-08-13 PROCEDURE — 71275 CT ANGIOGRAPHY CHEST: CPT

## 2025-08-13 PROCEDURE — 36415 COLL VENOUS BLD VENIPUNCTURE: CPT

## 2025-08-13 PROCEDURE — 86140 C-REACTIVE PROTEIN: CPT

## 2025-08-13 PROCEDURE — 87040 BLOOD CULTURE FOR BACTERIA: CPT

## 2025-08-13 PROCEDURE — 82553 CREATINE MB FRACTION: CPT

## 2025-08-13 PROCEDURE — 83690 ASSAY OF LIPASE: CPT

## 2025-08-13 PROCEDURE — 84100 ASSAY OF PHOSPHORUS: CPT

## 2025-08-13 PROCEDURE — 82330 ASSAY OF CALCIUM: CPT

## 2025-08-13 PROCEDURE — 85730 THROMBOPLASTIN TIME PARTIAL: CPT

## 2025-08-13 PROCEDURE — 84443 ASSAY THYROID STIM HORMONE: CPT

## 2025-08-13 PROCEDURE — 96374 THER/PROPH/DIAG INJ IV PUSH: CPT

## 2025-08-13 PROCEDURE — 99285 EMERGENCY DEPT VISIT HI MDM: CPT

## 2025-08-13 PROCEDURE — 81001 URINALYSIS AUTO W/SCOPE: CPT

## 2025-08-13 PROCEDURE — 85018 HEMOGLOBIN: CPT

## 2025-08-13 PROCEDURE — 85025 COMPLETE CBC W/AUTO DIFF WBC: CPT

## 2025-08-13 PROCEDURE — 99232 SBSQ HOSP IP/OBS MODERATE 35: CPT

## 2025-08-13 PROCEDURE — 82435 ASSAY OF BLOOD CHLORIDE: CPT

## 2025-08-13 PROCEDURE — 82550 ASSAY OF CK (CPK): CPT

## 2025-08-13 PROCEDURE — 82947 ASSAY GLUCOSE BLOOD QUANT: CPT

## 2025-08-13 PROCEDURE — 83735 ASSAY OF MAGNESIUM: CPT

## 2025-08-13 PROCEDURE — 85610 PROTHROMBIN TIME: CPT

## 2025-08-13 PROCEDURE — 74177 CT ABD & PELVIS W/CONTRAST: CPT

## 2025-08-13 PROCEDURE — 82010 KETONE BODYS QUAN: CPT

## 2025-08-13 PROCEDURE — 80053 COMPREHEN METABOLIC PANEL: CPT

## 2025-08-13 PROCEDURE — 82962 GLUCOSE BLOOD TEST: CPT

## 2025-08-13 PROCEDURE — 84295 ASSAY OF SERUM SODIUM: CPT

## 2025-08-13 RX ORDER — GABAPENTIN 400 MG/1
600 CAPSULE ORAL
Refills: 0 | Status: DISCONTINUED | OUTPATIENT
Start: 2025-08-13 | End: 2025-08-13

## 2025-08-13 RX ORDER — LOSARTAN POTASSIUM 100 MG/1
1 TABLET, FILM COATED ORAL
Refills: 0 | DISCHARGE

## 2025-08-13 RX ORDER — METOPROLOL SUCCINATE 50 MG/1
0.5 TABLET, EXTENDED RELEASE ORAL
Qty: 0 | Refills: 0 | DISCHARGE

## 2025-08-13 RX ORDER — ASPIRIN 325 MG
81 TABLET ORAL DAILY
Refills: 0 | Status: DISCONTINUED | OUTPATIENT
Start: 2025-08-13 | End: 2025-08-13

## 2025-08-13 RX ORDER — INSULIN LISPRO 100 U/ML
5 INJECTION, SOLUTION INTRAVENOUS; SUBCUTANEOUS
Refills: 0 | Status: DISCONTINUED | OUTPATIENT
Start: 2025-08-13 | End: 2025-08-13

## 2025-08-13 RX ORDER — ACETAMINOPHEN 500 MG/5ML
1000 LIQUID (ML) ORAL ONCE
Refills: 0 | Status: COMPLETED | OUTPATIENT
Start: 2025-08-13 | End: 2025-08-13

## 2025-08-13 RX ORDER — RIVAROXABAN 10 MG/1
1 TABLET, FILM COATED ORAL
Qty: 30 | Refills: 0
Start: 2025-08-13 | End: 2025-09-11

## 2025-08-13 RX ORDER — INSULIN ASPART 100 [IU]/ML
10 INJECTION, SOLUTION INTRAVENOUS; SUBCUTANEOUS
Qty: 0 | Refills: 0 | DISCHARGE

## 2025-08-13 RX ORDER — INSULIN GLARGINE-YFGN 100 [IU]/ML
13 INJECTION, SOLUTION SUBCUTANEOUS ONCE
Refills: 0 | Status: COMPLETED | OUTPATIENT
Start: 2025-08-13 | End: 2025-08-13

## 2025-08-13 RX ADMIN — GABAPENTIN 600 MILLIGRAM(S): 400 CAPSULE ORAL at 17:19

## 2025-08-13 RX ADMIN — INSULIN GLARGINE-YFGN 13 UNIT(S): 100 INJECTION, SOLUTION SUBCUTANEOUS at 03:03

## 2025-08-13 RX ADMIN — GABAPENTIN 600 MILLIGRAM(S): 400 CAPSULE ORAL at 05:27

## 2025-08-13 RX ADMIN — Medication 1000 MILLIGRAM(S): at 06:27

## 2025-08-13 RX ADMIN — INSULIN LISPRO 5 UNIT(S): 100 INJECTION, SOLUTION INTRAVENOUS; SUBCUTANEOUS at 08:05

## 2025-08-13 RX ADMIN — ISOSORBIDE MONONITRATE 30 MILLIGRAM(S): 60 TABLET, EXTENDED RELEASE ORAL at 11:47

## 2025-08-13 RX ADMIN — METOPROLOL SUCCINATE 12.5 MILLIGRAM(S): 50 TABLET, EXTENDED RELEASE ORAL at 17:19

## 2025-08-13 RX ADMIN — METOPROLOL SUCCINATE 12.5 MILLIGRAM(S): 50 TABLET, EXTENDED RELEASE ORAL at 05:27

## 2025-08-13 RX ADMIN — Medication 81 MILLIGRAM(S): at 11:47

## 2025-08-13 RX ADMIN — RIVAROXABAN 20 MILLIGRAM(S): 10 TABLET, FILM COATED ORAL at 17:32

## 2025-08-13 RX ADMIN — INSULIN LISPRO 3: 100 INJECTION, SOLUTION INTRAVENOUS; SUBCUTANEOUS at 08:04

## 2025-08-13 RX ADMIN — INSULIN LISPRO 5 UNIT(S): 100 INJECTION, SOLUTION INTRAVENOUS; SUBCUTANEOUS at 17:31

## 2025-08-13 RX ADMIN — INSULIN LISPRO 2: 100 INJECTION, SOLUTION INTRAVENOUS; SUBCUTANEOUS at 11:47

## 2025-08-13 RX ADMIN — Medication 400 MILLIGRAM(S): at 05:27

## 2025-08-13 RX ADMIN — INSULIN LISPRO 5 UNIT(S): 100 INJECTION, SOLUTION INTRAVENOUS; SUBCUTANEOUS at 11:48

## 2025-08-13 RX ADMIN — INSULIN LISPRO 2: 100 INJECTION, SOLUTION INTRAVENOUS; SUBCUTANEOUS at 17:31

## 2025-08-17 LAB
CULTURE RESULTS: SIGNIFICANT CHANGE UP
CULTURE RESULTS: SIGNIFICANT CHANGE UP
SPECIMEN SOURCE: SIGNIFICANT CHANGE UP
SPECIMEN SOURCE: SIGNIFICANT CHANGE UP